# Patient Record
Sex: FEMALE | Race: WHITE
[De-identification: names, ages, dates, MRNs, and addresses within clinical notes are randomized per-mention and may not be internally consistent; named-entity substitution may affect disease eponyms.]

---

## 2017-04-01 ENCOUNTER — HOSPITAL ENCOUNTER (OUTPATIENT)
Dept: HOSPITAL 31 - C.ER | Age: 77
Setting detail: OBSERVATION
LOS: 2 days | Discharge: HOME | End: 2017-04-03
Attending: INTERNAL MEDICINE | Admitting: INTERNAL MEDICINE
Payer: MEDICARE

## 2017-04-01 VITALS — BODY MASS INDEX: 29.2 KG/M2

## 2017-04-01 DIAGNOSIS — R42: ICD-10-CM

## 2017-04-01 DIAGNOSIS — I10: ICD-10-CM

## 2017-04-01 DIAGNOSIS — E78.00: ICD-10-CM

## 2017-04-01 DIAGNOSIS — F41.8: ICD-10-CM

## 2017-04-01 DIAGNOSIS — I48.91: ICD-10-CM

## 2017-04-01 DIAGNOSIS — Z23: ICD-10-CM

## 2017-04-01 DIAGNOSIS — R07.9: Primary | ICD-10-CM

## 2017-04-01 LAB
ALBUMIN/GLOB SERPL: 1.4 {RATIO} (ref 1–2.1)
ALP SERPL-CCNC: 97 U/L (ref 38–126)
ALT SERPL-CCNC: 14 U/L (ref 9–52)
AST SERPL-CCNC: 23 U/L (ref 14–36)
BACTERIA #/AREA URNS HPF: (no result) /[HPF]
BASOPHILS # BLD AUTO: 0.1 K/UL (ref 0–0.2)
BASOPHILS NFR BLD: 0.8 % (ref 0–2)
BILIRUB SERPL-MCNC: 0.6 MG/DL (ref 0.2–1.3)
BILIRUB UR-MCNC: NEGATIVE MG/DL
BUN SERPL-MCNC: 27 MG/DL (ref 7–17)
CALCIUM SERPL-MCNC: 8.9 MG/DL (ref 8.6–10.4)
CHLORIDE SERPL-SCNC: 95 MMOL/L (ref 98–107)
CO2 SERPL-SCNC: 27 MMOL/L (ref 22–30)
EOSINOPHIL # BLD AUTO: 0.1 K/UL (ref 0–0.7)
EOSINOPHIL NFR BLD: 1.9 % (ref 0–4)
ERYTHROCYTE [DISTWIDTH] IN BLOOD BY AUTOMATED COUNT: 14.3 % (ref 11.5–14.5)
GLOBULIN SER-MCNC: 3.1 GM/DL (ref 2.2–3.9)
GLUCOSE SERPL-MCNC: 98 MG/DL (ref 65–105)
GLUCOSE UR STRIP-MCNC: NORMAL MG/DL
HCT VFR BLD CALC: 36.3 % (ref 34–47)
INR PPP: 1.6
KETONES UR STRIP-MCNC: NEGATIVE MG/DL
LEUKOCYTE ESTERASE UR-ACNC: (no result) LEU/UL
LYMPHOCYTES # BLD AUTO: 1.1 K/UL (ref 1–4.3)
LYMPHOCYTES NFR BLD AUTO: 15.7 % (ref 20–40)
MCH RBC QN AUTO: 30.4 PG (ref 27–31)
MCHC RBC AUTO-ENTMCNC: 32.8 G/DL (ref 33–37)
MCV RBC AUTO: 92.9 FL (ref 81–99)
MONOCYTES # BLD: 0.5 K/UL (ref 0–0.8)
MONOCYTES NFR BLD: 7.8 % (ref 0–10)
NRBC BLD AUTO-RTO: 0 % (ref 0–2)
PH UR STRIP: 7 [PH] (ref 5–8)
PLATELET # BLD: 184 K/UL (ref 130–400)
PMV BLD AUTO: 10.1 FL (ref 7.2–11.7)
POTASSIUM SERPL-SCNC: 4.2 MMOL/L (ref 3.6–5.2)
PROT SERPL-MCNC: 7.3 G/DL (ref 6.3–8.3)
PROT UR STRIP-MCNC: NEGATIVE MG/DL
RBC # UR STRIP: NEGATIVE /UL
RBC #/AREA URNS HPF: 1 /HPF (ref 0–3)
SODIUM SERPL-SCNC: 135 MMOL/L (ref 132–148)
SP GR UR STRIP: 1.01 (ref 1–1.03)
UROBILINOGEN UR-MCNC: NORMAL MG/DL (ref 0.2–1)
WBC # BLD AUTO: 6.9 K/UL (ref 4.8–10.8)
WBC #/AREA URNS HPF: 1 /HPF (ref 0–5)

## 2017-04-01 PROCEDURE — 80053 COMPREHEN METABOLIC PANEL: CPT

## 2017-04-01 PROCEDURE — 99285 EMERGENCY DEPT VISIT HI MDM: CPT

## 2017-04-01 PROCEDURE — 97161 PT EVAL LOW COMPLEX 20 MIN: CPT

## 2017-04-01 PROCEDURE — 96360 HYDRATION IV INFUSION INIT: CPT

## 2017-04-01 PROCEDURE — 71020: CPT

## 2017-04-01 PROCEDURE — 84484 ASSAY OF TROPONIN QUANT: CPT

## 2017-04-01 PROCEDURE — 85610 PROTHROMBIN TIME: CPT

## 2017-04-01 PROCEDURE — 83690 ASSAY OF LIPASE: CPT

## 2017-04-01 PROCEDURE — 93005 ELECTROCARDIOGRAM TRACING: CPT

## 2017-04-01 PROCEDURE — 97110 THERAPEUTIC EXERCISES: CPT

## 2017-04-01 PROCEDURE — 70450 CT HEAD/BRAIN W/O DYE: CPT

## 2017-04-01 PROCEDURE — 81001 URINALYSIS AUTO W/SCOPE: CPT

## 2017-04-01 PROCEDURE — 85025 COMPLETE CBC W/AUTO DIFF WBC: CPT

## 2017-04-01 NOTE — C.PDOC
History Of Present Illness


76 yr old female with PMHx of HTN and A-Fib, presents to  the ER with 

complaints of chest pain, dizziness and headache for the past few weeks, along 

with on and off left sided chest pain. Patient states she saw her PMD 4 days 

ago who said if she doesn't feel any better in few days  to come to ED. Patient 

denies fever, chills, vision changes, nausea, vomiting, weakness or numbness. 


Time Seen by Provider: 17 09:15


Chief Complaint (Nursing): Dizziness/Lightheaded


History Per: Patient


History/Exam Limitations: no limitations


Onset/Duration Of Symptoms: Days (Few days)


Current Symptoms Are (Timing): Still Present





Past Medical History


Reviewed: Historical Data, Nursing Documentation, Vital Signs


Vital Signs: 


 Last Vital Signs











Temp  98.3 F   17 15:35


 


Pulse  60   17 17:12


 


Resp  18   17 15:35


 


BP  156/69 H  17 15:35


 


Pulse Ox  95   17 15:35














- Medical History


PMH: Anxiety, Asthma, Atrial Fibrillation, Cardia Arrhythmia, Depression, 

Gastritis, HTN, Hypercholesterolemia


Surgical History: Cholecystectomy, Pacemaker (16)





- [facility] Procedures








CATARAC PHACOEMULS/ASPIR (07/21/15)


INSERT LENS AT CATAR EXT (07/21/15)


INSERT OF MONITOR DEV INTO CHEST SUBCU/FASCIA, PERC APPROACH (16)


VACCINATION NEC (14)








Family History: States: No Known Family Hx





- Social History


Hx Tobacco Use: No


Hx Alcohol Use: No


Hx Substance Use: No





- Immunization History


Hx Tetanus Toxoid Vaccination: No


Hx Influenza Vaccination: Yes


Hx Pneumococcal Vaccination: Yes





Review Of Systems


Except As Marked, All Systems Reviewed And Found Negative.


Constitutional: Negative for: Fever, Chills


Cardiovascular: Positive for: Chest Pain (Left sided)


Gastrointestinal: Negative for: Nausea, Vomiting


Neurological: Positive for: Headache, Dizziness.  Negative for: Weakness, 

Numbness





Physical Exam





- Physical Exam


Appears: Well, Non-toxic, No Acute Distress


Skin: Normal Color, Warm, Dry


Head: Atraumatic, Normacephalic


Eye(s): bilateral: Normal Inspection, PERRL, EOMI


Throat: Normal, No Erythema, No Exudate


Chest: Symmetrical, Tenderness (Non reproducible left chest tenderness)


Cardiovascular: Rhythm Regular, No Friction Rub, No Murmur


Respiratory: Normal Breath Sounds


Gastrointestinal/Abdominal: Normal Exam, Soft, No Tenderness, No Guarding, No 

Rebound


Extremity: Normal ROM, No Swelling


Neurological/Psych: Oriented x3, Normal Speech





ED Course And Treatment





- Laboratory Results


Result Diagrams: 


 17 09:29





 17 09:29


Lab Interpretation: Normal


ECG: Interpreted By Me


ECG Rhythm: A Paced


ECG Interpretation: No Acute Changes, Abnormal


Interpretation Of ECG: Atrial-paced rhythm. Nonspecific ST abnormality.


Rate From EC (BPM )


O2 Sat by Pulse Oximetry: 98 (RA)


Pulse Ox Interpretation: Normal





- Radiology


CXR: Interpreted by Me


CXR Interpretation: Yes: No Acute Disease





- CT Scan/US


  ** CT - Head 


Other Rad Studies (CT/US): Read By Radiologist, Radiology Report Reviewed


CT/US Interpretation: PROCEDURE:  CT HEAD WITHOUT CONTRAST.  HISTORY:  fall.  

COMPARISON:  Comparison is made to the previous study dated 09/10/2016.  

TECHNIQUE:  Axial computed tomography images were obtained through the head/

brain without intravenous contrast.  This CT exam was performed using one or 

more of the following dose reduction techniques: Automated exposure control, 

adjustment of the mA and/or kv according to patient size, and/or use of 

iterative reconstruction technique.  Radiation dose:  Total exam DLP = 825.82 

mGy-cm.  FINDINGS:  HEMORRHAGE:  No intracranial hemorrhage.  BRAIN:  No mass 

effect or edema.  No atrophy or chronic microvascular ischemic changes.  

VENTRICLES:  Unremarkable. No hydrocephalus.  CALVARIUM:  Unremarkable.  

PARANASAL SINUSES:  Unremarkable as visualized. No significant inflammatory 

changes.  MASTOID AIR CELLS:  Unremarkable as visualized. No inflammatory 

changes.  OTHER FINDINGS:  None.  IMPRESSION:  No evidence of acute 

intracranial hemorrhage intracranial collection mass effect or midline shift.  

No significant interval change compared to the previous study dated 09/10/2016.





- Physician Consult Information


Physician Contacted: Liseth Olivas


Outcome Of Conversation: tele





Medical Decision Making


Medical Decision Making: 


PLAN:


* CT - Head 


* CXR


* EKG 


* Troponin 


* CBC 


* Urinalysis 


* Sodium Chloride IV 





Disposition


Discussed With : Liseth Olivas


Doctor Will See Patient In The: Hospital





- Disposition


Disposition: HOSPITALIZED


Disposition Time: 10:40


Condition: STABLE





- POA


Present On Arrival: None





- Clinical Impression


Clinical Impression: 


 Chest pain, Dizziness





- PA / NP / Resident Statement


MD/DO has reviewed & agrees with the documentation as recorded.





- Scribe Statement


The provider has reviewed the documentation as recorded by the Scribe


Sofía Gallegos





All medical record entries made by the Lisette were at my direction and 

personally dictated by me. I have reviewed the chart and agree that the record 

accurately reflects my personal performance of the history, physical exam, 

medical decision making, and the department course for this patient. I have 

also personally directed, reviewed, and agree with the discharge instructions 

and disposition.





Decision To Admit





- Pt Status Changed To:


Hospital Disposition Of: Observation





- .


Bed Request Type: Telemetry


Admitting Physician: Liseth Olivas


Patient Diagnosis: 


 Chest pain, Dizziness

## 2017-04-01 NOTE — RAD
HISTORY:

Cough  



COMPARISON:

Comparison is made to the previous study dated 10/05/2016



TECHNIQUE:

Chest PA and lateral



FINDINGS:



LUNGS:

Prominent lung markings seen at the lower lobes again.



PLEURA:

No significant pleural effusion identified. No pneumothorax apparent.



CARDIOVASCULAR:

Left-sided pacemaker seen in place.



OSSEOUS STRUCTURES:

No significant abnormalities.



VISUALIZED UPPER ABDOMEN:

Normal.



OTHER FINDINGS:

None.



IMPRESSION:

Prominent reticular markings seen at the lower lobes.

## 2017-04-01 NOTE — CP.PCM.CON
History of Present Illness





- History of Present Illness


History of Present Illness: 


CC:  Presyncope and fatigue





HPI:  76 yr old female with PMHx of HTN and A-Fib, presents to  the ER with 

complaints of dizziness and headache for the past few weeks, along with on and 

off left sided chest pain. Patient states she saw her PMD 4 days ago who said 

if she doesn't feel any better in few days  to come to ED. Patient denies fever

, chills, vision changes, nausea, vomiting, weakness or numbness. She is also 

reporting shortness of breath on exertion improves with rest.  This has been 

occuring for the past 2 weeks.  She is reporting improvement in her symptoms 

now.  





Review of Systems





- Review of Systems


All systems: reviewed and no additional remarkable complaints except





Past Patient History





- Infectious Disease


Hx of Infectious Diseases: None





- Past Medical History & Family History


Past Medical History?: Yes





- Past Social History


Smoking Status: Never Smoked





- CARDIAC


Hx Atrial Fibrillation: Yes


Hx Cardia Arrhythmia: Yes


Hx Hypercholesterolemia: Yes


Hx Hypertension: Yes


Hx Pacemaker: Yes (6/16/16)





- PULMONARY


Hx Asthma: Yes





- NEUROLOGICAL


Hx Neurological Disorder: Yes


Hx Syncope: Yes


Hx Vertigo: Yes





- HEENT


Hx HEENT Problems: Yes





- RENAL


Hx Chronic Kidney Disease: No





- ENDOCRINE/METABOLIC


Hx Endocrine Disorders: No





- HEMATOLOGICAL/ONCOLOGICAL


Hx Blood Disorders: No





- INTEGUMENTARY


Hx Dermatological Problems: No





- MUSCULOSKELETAL/RHEUMATOLOGICAL


Hx Musculoskeletal Disorders: Yes


Hx Falls: No





- GASTROINTESTINAL


Hx Gastritis: Yes





- GENITOURINARY/GYNECOLOGICAL


Hx Genitourinary Disorders: No





- PSYCHIATRIC


Hx Anxiety: Yes


Hx Depression: Yes


Hx Substance Use: No





- SURGICAL HISTORY


Hx Surgeries: Yes


Hx Cholecystectomy: Yes





- ANESTHESIA


Hx Anesthesia: Yes


Hx Anesthesia Reactions: No


Hx Malignant Hyperthermia: No





Meds


Allergies/Adverse Reactions: 


 Allergies











Allergy/AdvReac Type Severity Reaction Status Date / Time


 


No Known Allergies Allergy   Verified 10/05/16 13:07














- Medications


Medications: 


 Current Medications





Carvedilol (Coreg)  6.25 mg PO BID [facility]


Diltiazem HCl (Cardizem Cd)  180 mg PO DAILY [facility]


Lisinopril (Zestril)  10 mg PO DAILY [facility]


Pantoprazole Sodium (Protonix Ec Tab)  40 mg PO DAILY [facility]


Rivaroxaban (Xarelto)  15 mg PO BID [facility]











Physical Exam





- Constitutional


Appears: Well, Non-toxic





- Head Exam


Head Exam: ATRAUMATIC, NORMAL INSPECTION





- Eye Exam


Eye Exam: absent: Scleral icterus


Pupil Exam: PERRL





- ENT Exam


ENT Exam: Mucous Membranes Moist, Normal Exam





- Neck Exam


Neck exam: Positive for: Full Rom.  Negative for: Lymphadenopathy





- Respiratory Exam


Respiratory Exam: Clear to Auscultation Bilateral, NORMAL BREATHING PATTERN





- Cardiovascular Exam


Cardiovascular Exam: Bradycardia, REGULAR RHYTHM, RRR, +S1, +S2.  absent: JVD





- GI/Abdominal Exam


GI & Abdominal Exam: Normal Bowel Sounds, Soft.  absent: Organomegaly





- Neurological Exam


Neurological exam: CN II-XII Intact, Oriented x3





- Psychiatric Exam


Psychiatric exam: Normal Affect, Normal Mood





Results





- Vital Signs


Recent Vital Signs: 


 Last Vital Signs











Temp  98.3 F   04/01/17 14:19


 


Pulse  60   04/01/17 14:19


 


Resp  20   04/01/17 14:19


 


BP  152/70 H  04/01/17 14:19


 


Pulse Ox  96   04/01/17 14:19














- Labs


Result Diagrams: 


 04/01/17 09:29





 04/01/17 09:29





- EKG Data


EKG Interpreted by: Myself (Telemetry viewed by myself:  NSR)





- Imaging and Cardiology


  ** Chest x-ray


Status: Image reviewed by me (no infiltrates or effusions )





Assessment & Plan





- Assessment and Plan (Free Text)


Assessment: 


76 year old female with paroxysmal atrial fibrillation s/p ablation and PPM, 

now presents with presyncope and shortness of breath unclear if there is a 

cardiac etiology


HTN is chronic and stable continue Coreg, lisinopril and cardizem





After reviewed of chart and re interview with patient she is reporting she is a 

patient of Dr. Xuan Darnell.  I will call him to imform his patient is 

here.  





- Date & Time


Date: 04/01/17


Time: 15:34

## 2017-04-01 NOTE — CP.PCM.HP
History of Present Illness





- History of Present Illness


History of Present Illness: 


pt came to er feeling palpitation diziness has at fib





Present on Admission





- Present on Admission


Any Indicators Present on Admission: No





Review of Systems





- Review of Systems


Systems not reviewed;Unavailable: Acuity of Condition





- Constitutional


Constitutional: Fatigue





- EENT


Eyes: As Per HPI


Ears: As Per HPI


Nose/Mouth/Throat: As Per HPI





- Breasts


Breasts: As Per HPI





- Cardiovascular


Cardiovascular: Chest Pain at Rest, Lightheadedness, Rapid Heart Rate





- Respiratory


Respiratory: Dyspnea on Exertion





- Gastrointestinal


Gastrointestinal: As Per HPI





- Genitourinary


Genitourinary: As Per HPI





- Reproductive: Female


Reproductive:Female: As Per HPI





- Menstruation


Menstruation: As Per HPI





- Musculoskeletal


Musculoskeletal: As Per HPI





- Integumentary


Integumentary: As Per HPI





- Neurological


Neurological: As Per HPI





- Psychiatric


Psychiatric: As Per HPI





- Endocrine


Endocrine: As Per HPI





- Hematologic/Lymphatic


Hematologic: As Per HPI





Past Patient History





- Infectious Disease


Hx of Infectious Diseases: None





- Past Medical History & Family History


Past Medical History?: Yes





- Past Social History


Smoking Status: Never Smoked





- CARDIAC


Hx Atrial Fibrillation: Yes


Hx Cardia Arrhythmia: Yes


Hx Hypercholesterolemia: Yes


Hx Hypertension: Yes


Hx Pacemaker: Yes (6/16/16)





- PULMONARY


Hx Asthma: Yes





- NEUROLOGICAL


Hx Neurological Disorder: Yes


Hx Syncope: Yes


Hx Vertigo: Yes





- HEENT


Hx HEENT Problems: Yes





- RENAL


Hx Chronic Kidney Disease: No





- ENDOCRINE/METABOLIC


Hx Endocrine Disorders: No





- HEMATOLOGICAL/ONCOLOGICAL


Hx Blood Disorders: No





- INTEGUMENTARY


Hx Dermatological Problems: No





- MUSCULOSKELETAL/RHEUMATOLOGICAL


Hx Musculoskeletal Disorders: Yes


Hx Falls: No





- GASTROINTESTINAL


Hx Gastritis: Yes





- GENITOURINARY/GYNECOLOGICAL


Hx Genitourinary Disorders: No





- PSYCHIATRIC


Hx Anxiety: Yes


Hx Depression: Yes


Hx Substance Use: No





- SURGICAL HISTORY


Hx Cholecystectomy: Yes





- ANESTHESIA


Hx Anesthesia: Yes


Hx Anesthesia Reactions: No


Hx Malignant Hyperthermia: No





Meds


Allergies/Adverse Reactions: 


 Allergies











Allergy/AdvReac Type Severity Reaction Status Date / Time


 


No Known Allergies Allergy   Verified 10/05/16 13:07














Physical Exam





- Constitutional


Appears: In Acute Distress





- Head Exam


Head Exam: ATRAUMATIC





- Eye Exam


Eye Exam: Normal appearance


Pupil Exam: NORMAL ACCOMODATION





- ENT Exam


ENT Exam: Mucous Membranes Moist





- Neck Exam


Neck exam: Positive for: Full Rom





- Respiratory Exam


Respiratory Exam: Clear to Auscultation Bilateral





- Cardiovascular Exam


Cardiovascular Exam: Irregular Rhythm





- GI/Abdominal Exam


GI & Abdominal Exam: Normal Bowel Sounds





- Rectal Exam


Rectal Exam: NORMAL INSPECTION





- Extremities Exam


Extremities exam: Positive for: normal inspection





- Back Exam


Back exam: NORMAL INSPECTION





- Neurological Exam


Neurological exam: Oriented x3





- Psychiatric Exam


Psychiatric exam: Normal Mood





- Skin


Skin Exam: Normal Color





Results





- Vital Signs


Recent Vital Signs: 





 Last Vital Signs











Temp  98.3 F   04/01/17 15:35


 


Pulse  60   04/01/17 17:12


 


Resp  18   04/01/17 15:35


 


BP  156/69 H  04/01/17 15:35


 


Pulse Ox  98   04/01/17 17:25














- Labs


Result Diagrams: 


 04/01/17 09:29





 04/01/17 09:29





Assessment & Plan





- Assessment and Plan (Free Text)


Assessment: 


at fib  diziness htn 


Plan: 


admit





- Date & Time


Date: 04/01/17


Time: 17:42

## 2017-04-02 RX ADMIN — DILTIAZEM HYDROCHLORIDE SCH MG: 180 CAPSULE, EXTENDED RELEASE ORAL at 09:48

## 2017-04-02 RX ADMIN — PANTOPRAZOLE SODIUM SCH MG: 40 TABLET, DELAYED RELEASE ORAL at 09:48

## 2017-04-02 NOTE — CP.PCM.PN
Subjective





- Date & Time of Evaluation


Date of Evaluation: 04/02/17


Time of Evaluation: 12:04





- Subjective


Subjective: 


no palpitation today but ct head hemorage





Objective





- Vital Signs/Intake and Output


Vital Signs (last 24 hours): 


 











Temp Pulse Resp BP Pulse Ox


 


 97.3 F L  70   20   130/64   96 


 


 04/02/17 08:11  04/02/17 09:51  04/02/17 09:51  04/02/17 11:04  04/02/17 08:11











- Medications


Medications: 


 Current Medications





Acetaminophen (Tylenol 325mg Tab)  650 mg PO Q6 PRN


   PRN Reason: headache


   Last Admin: 04/01/17 17:55 Dose:  650 mg


Carvedilol (Coreg)  6.25 mg PO BID [facility]


   Last Admin: 04/02/17 09:48 Dose:  6.25 mg


Diltiazem HCl (Cardizem Cd)  180 mg PO DAILY [facility]


   Last Admin: 04/02/17 09:48 Dose:  180 mg


Influenza Virus Vaccine (Afluria)  45 mcg IM .ONCE ONE


   Stop: 04/03/17 10:55


Lisinopril (Zestril)  10 mg PO DAILY [facility]


   Last Admin: 04/02/17 09:48 Dose:  10 mg


Pantoprazole Sodium (Protonix Ec Tab)  40 mg PO DAILY [facility]


   Last Admin: 04/02/17 09:48 Dose:  40 mg


Pneumococcal Polyvalent Vaccine (Pneumovax 23 Vaccine)  0.5 ml IM .ONCE ONE


   Stop: 04/03/17 10:01


Rivaroxaban (Xarelto)  15 mg PO BID [facility]


   Last Admin: 04/02/17 09:48 Dose:  15 mg











- Labs


Labs: 


 











PT  18.8 SECONDS (9.7-12.2)  H  04/01/17  09:29    


 


INR  1.6   04/01/17  09:29    














- Constitutional


Appears: Non-toxic





- Head Exam


Head Exam: NORMAL INSPECTION





- Eye Exam


Eye Exam: Normal appearance


Pupil Exam: NORMAL ACCOMODATION





- ENT Exam


ENT Exam: Mucous Membranes Moist





- Neck Exam


Neck Exam: Full ROM





- Respiratory Exam


Respiratory Exam: Decreased Breath Sounds





- Cardiovascular Exam


Cardiovascular Exam: Irregular Rhythm





- GI/Abdominal Exam


GI & Abdominal Exam: Normal Bowel Sounds





- Extremities Exam


Extremities Exam: Normal Inspection





- Back Exam


Back Exam: NORMAL INSPECTION





- Psychiatric Exam


Psychiatric exam: Normal Affect





- Skin


Skin Exam: Normal Color





Assessment and Plan





- Assessment and Plan (Free Text)


Assessment: 


at fib ac brain hge depresion hx


Plan: 


d/c

## 2017-04-02 NOTE — CP.PCM.CON
History of Present Illness





- History of Present Illness


History of Present Illness: 





75 y/o female h/o HTN atial fib s/p ppm presents with palpitations.  Was at 

home and felt heart racing and came to hospital.  No cp , nausea, sob dysuria





Review of Systems





- Review of Systems


Systems not reviewed;Unavailable: Acuity of Condition





- Constitutional


Constitutional: Fatigue





- EENT


Eyes: absent: Change in Vision


Ears: absent: Ear Pain


Nose/Mouth/Throat: absent: Nasal Discharge





- Cardiovascular


Cardiovascular: Rapid Heart Rate.  absent: Chest Pain





- Respiratory


Respiratory: absent: Dyspnea





- Gastrointestinal


Gastrointestinal: absent: Abdominal Pain





- Genitourinary


Genitourinary: Dysuria





- Musculoskeletal


Musculoskeletal: absent: Loss of Height





- Integumentary


Integumentary: absent: Change in Hair





- Neurological


Neurological: absent: Abnormal Movements





- Psychiatric


Psychiatric: absent: Anxiety





- Endocrine


Endocrine: absent: Excessive Sweating





- Hematologic/Lymphatic


Hematologic: absent: Easy Bruising





Past Patient History





- Infectious Disease


Hx of Infectious Diseases: None





- Past Medical History & Family History


Past Medical History?: Yes





- Past Social History


Smoking Status: Never Smoked





- CARDIAC


Hx Hypercholesterolemia: Yes


Hx Hypertension: Yes





- PULMONARY


Hx Asthma: Yes





- NEUROLOGICAL


Hx Neurological Disorder: Yes


Hx Syncope: Yes


Hx Vertigo: Yes





- HEENT


Hx HEENT Problems: Yes





- RENAL


Hx Chronic Kidney Disease: No





- ENDOCRINE/METABOLIC


Hx Endocrine Disorders: No





- HEMATOLOGICAL/ONCOLOGICAL


Hx Blood Disorders: No





- INTEGUMENTARY


Hx Dermatological Problems: No





- MUSCULOSKELETAL/RHEUMATOLOGICAL


Hx Musculoskeletal Disorders: Yes


Hx Falls: No





- GASTROINTESTINAL


Hx Gastritis: Yes





- GENITOURINARY/GYNECOLOGICAL


Hx Genitourinary Disorders: No





- PSYCHIATRIC


Hx Anxiety: Yes


Hx Depression: Yes


Hx Substance Use: No





- SURGICAL HISTORY


Hx Surgeries: Yes


Hx Cholecystectomy: Yes





- ANESTHESIA


Hx Anesthesia: Yes


Hx Anesthesia Reactions: No


Hx Malignant Hyperthermia: No





Meds


Allergies/Adverse Reactions: 


 Allergies











Allergy/AdvReac Type Severity Reaction Status Date / Time


 


No Known Allergies Allergy   Verified 10/05/16 13:07














- Medications


Medications: 


 Current Medications





Acetaminophen (Tylenol 325mg Tab)  650 mg PO Q6 PRN


   PRN Reason: headache


   Last Admin: 04/01/17 17:55 Dose:  650 mg


Carvedilol (Coreg)  6.25 mg PO BID [facility]


   Last Admin: 04/02/17 09:48 Dose:  6.25 mg


Diltiazem HCl (Cardizem Cd)  180 mg PO DAILY [facility]


   Last Admin: 04/02/17 09:48 Dose:  180 mg


Influenza Virus Vaccine (Afluria)  45 mcg IM .ONCE ONE


   Stop: 04/03/17 10:55


Lisinopril (Zestril)  10 mg PO DAILY [facility]


   Last Admin: 04/02/17 09:48 Dose:  10 mg


Pantoprazole Sodium (Protonix Ec Tab)  40 mg PO DAILY [facility]


   Last Admin: 04/02/17 09:48 Dose:  40 mg


Pneumococcal Polyvalent Vaccine (Pneumovax 23 Vaccine)  0.5 ml IM .ONCE ONE


   Stop: 04/03/17 10:01


Rivaroxaban (Xarelto)  15 mg PO BID [facility]


   Last Admin: 04/02/17 09:48 Dose:  15 mg











Physical Exam





- Constitutional


Appears: Non-toxic





- Head Exam


Head Exam: NORMAL INSPECTION





- Eye Exam


Eye Exam: Normal appearance





- ENT Exam


ENT Exam: Mucous Membranes Moist





- Respiratory Exam


Respiratory Exam: Clear to Auscultation Bilateral





- Cardiovascular Exam


Cardiovascular Exam: REGULAR RHYTHM, Systolic Murmur





- GI/Abdominal Exam


GI & Abdominal Exam: Normal Bowel Sounds





-  Exam


External exam: NORMAL EXTERNAL EXAM


Bimanual exam: NORMAL BIMANUAL EXAM





- Extremities Exam


Extremities exam: Positive for: normal inspection





- Neurological Exam


Neurological exam: Alert





- Psychiatric Exam


Psychiatric exam: Normal Mood





- Skin


Skin Exam: Warm





Results





- Vital Signs


Recent Vital Signs: 


 Last Vital Signs











Temp  97.3 F L  04/02/17 08:11


 


Pulse  70   04/02/17 09:51


 


Resp  20   04/02/17 09:51


 


BP  130/64   04/02/17 11:04


 


Pulse Ox  96   04/02/17 08:11














- Labs


Result Diagrams: 


 04/01/17 09:29





 04/01/17 09:29


Labs: 


 Laboratory Results - last 24 hr











  04/01/17





  17:08


 


Total Creatine Kinase  88


 


CK-MB (Mass)  1.32


 


Troponin I, Quant  < 0.0120














Assessment & Plan


(1) Afib


Assessment and Plan: 


Pt will benefit from rate control and anticoagulation.  Pacing on EKG apaced v 

paced


will follow with you


Status: Acute   





(2) Bradycardia


Status: Acute   





(3) History of pacemaker


Status: Acute

## 2017-04-03 VITALS
RESPIRATION RATE: 20 BRPM | TEMPERATURE: 97.7 F | SYSTOLIC BLOOD PRESSURE: 112 MMHG | OXYGEN SATURATION: 96 % | DIASTOLIC BLOOD PRESSURE: 59 MMHG

## 2017-04-03 VITALS — HEART RATE: 50 BPM

## 2017-04-03 RX ADMIN — DILTIAZEM HYDROCHLORIDE SCH MG: 180 CAPSULE, EXTENDED RELEASE ORAL at 10:51

## 2017-04-03 RX ADMIN — PANTOPRAZOLE SODIUM SCH MG: 40 TABLET, DELAYED RELEASE ORAL at 10:50

## 2017-04-03 NOTE — CP.PCM.PN
Addendum entered and electronically signed by Robina Morales DO  04/03/17 15:

12: 





Patient appeared anxious upon examined. Given Xanax 0.25mg BID prn anxiety. 

Patient is clear for d.c as per cardiology standpoint. She can follow up with 

Dr. Murphy in 1 week. 





Original Note:








<Robina Morales - Last Filed: 04/03/17 09:30>





Subjective





- Date & Time of Evaluation


Date of Evaluation: 04/03/17


Time of Evaluation: 07:20





- Subjective


Subjective: 


Cardiology Progress Note for Dr. Murphy 





Patient seen and examined. Overnight patient noted to have HR minimum in the 

40s and going back up to the 120s. She was asymptomatic at this time. She 

denies having any palpitations, CP, SOB, n/v/d, numbness/tingling. She did 

report feeling dizzy during the day yesterday, but does not feel dizzy today. 





Objective





- Vital Signs/Intake and Output


Vital Signs (last 24 hours): 


 











Temp Pulse Resp BP Pulse Ox


 


 97.6 F   59 L  20   115/66   95 


 


 04/02/17 23:15  04/03/17 04:41  04/02/17 23:15  04/02/17 23:15  04/02/17 23:15











- Medications


Medications: 


 Current Medications





Acetaminophen (Tylenol 325mg Tab)  650 mg PO Q6 PRN


   PRN Reason: headache


   Last Admin: 04/01/17 17:55 Dose:  650 mg


Carvedilol (Coreg)  6.25 mg PO BID [facility]


   Last Admin: 04/02/17 17:31 Dose:  6.25 mg


Diltiazem HCl (Cardizem Cd)  180 mg PO DAILY [facility]


   Last Admin: 04/02/17 09:48 Dose:  180 mg


Influenza Virus Vaccine (Afluria)  45 mcg IM .ONCE ONE


   Stop: 04/03/17 10:55


Lisinopril (Zestril)  10 mg PO DAILY [facility]


   Last Admin: 04/02/17 09:48 Dose:  10 mg


Pantoprazole Sodium (Protonix Ec Tab)  40 mg PO DAILY [facility]


   Last Admin: 04/02/17 09:48 Dose:  40 mg


Pneumococcal Polyvalent Vaccine (Pneumovax 23 Vaccine)  0.5 ml IM .ONCE ONE


   Stop: 04/03/17 10:01


Rivaroxaban (Xarelto)  15 mg PO BID [facility]


   Last Admin: 04/02/17 17:31 Dose:  15 mg











- Labs


Labs: 


 











PT  18.8 SECONDS (9.7-12.2)  H  04/01/17  09:29    


 


INR  1.6   04/01/17  09:29    














- Constitutional


Appears: No Acute Distress





- Head Exam


Head Exam: NORMAL INSPECTION





- Eye Exam


Eye Exam: Normal appearance


Pupil Exam: NORMAL ACCOMODATION





- ENT Exam


ENT Exam: Mucous Membranes Moist





- Respiratory Exam


Respiratory Exam: Clear to Ausculation Bilateral, NORMAL BREATHING PATTERN.  

absent: Rales, Rhonchi, Wheezes, Stridor





- Cardiovascular Exam


Cardiovascular Exam: REGULAR RHYTHM, +S1, +S2.  absent: Gallop, Rubs, Murmur





- GI/Abdominal Exam


GI & Abdominal Exam: Soft, Normal Bowel Sounds.  absent: Rigid, Tenderness, Mass

, Rebound





- Extremities Exam


Extremities Exam: Normal Inspection.  absent: Calf Tenderness, Pedal Edema





- Neurological Exam


Neurological Exam: Alert, Awake, CN II-XII Intact, Oriented x3





- Psychiatric Exam


Psychiatric exam: Normal Affect, Normal Mood





- Skin


Skin Exam: Dry, Normal Color, Warm





Assessment and Plan





- Assessment and Plan (Free Text)


Assessment: 


This is a 76Y F with PMH HTN and A.fib s/p ablation admitted for 


1) Chest pain 


2) HTN


3) A.fib s/p ablation 


Plan: 


- Continue Coreg, Cardizem, Lisinopril


- Continue Xeralto


- Continue to monitor vitals 





GI ppx: Protonix


DVT ppx: Xeralto





Case seen, reviewed and discussed with Dr. Jeffrey Morales PGY1





<Gabriela Murphy - Last Filed: 05/08/17 05:28>





Objective





- Vital Signs/Intake and Output


Vital Signs (last 24 hours): 


 











Temp Pulse Resp BP Pulse Ox


 


 97.7 F   50 L  20   112/59 L  96 


 


 04/03/17 15:00  04/03/17 16:00  04/03/17 15:00  04/03/17 15:00  04/03/17 15:00











- Labs


Labs: 


 











PT  18.8 SECONDS (9.7-12.2)  H  04/01/17  09:29    


 


INR  1.6   04/01/17  09:29    














Attending/Attestation





- Attestation


I have personally seen and examined this patient.: Yes


I have fully participated in the care of the patient.: Yes


I have reviewed all pertinent clinical information, including history, physical 

exam and plan: Yes


Notes (Text): 





05/08/17 05:27


continue rate control and xarelto for cva prevention

## 2017-04-03 NOTE — CP.PCM.PN
Subjective





- Date & Time of Evaluation


Date of Evaluation: 04/03/17


Time of Evaluation: 17:53





- Subjective


Subjective: 


feels beter no pain no palpitation seen by cardiology cleare for d/c home 





Objective





- Vital Signs/Intake and Output


Vital Signs (last 24 hours): 


 











Temp Pulse Resp BP Pulse Ox


 


 97.7 F   50 L  20   112/59 L  96 


 


 04/03/17 15:00  04/03/17 16:00  04/03/17 15:00  04/03/17 15:00  04/03/17 15:00








Intake and Output: 


 











 04/03/17 04/03/17





 06:59 18:59


 


Intake Total  550


 


Balance  550














- Medications


Medications: 


 Current Medications





Acetaminophen (Tylenol 325mg Tab)  650 mg PO Q6 PRN


   PRN Reason: headache


   Last Admin: 04/01/17 17:55 Dose:  650 mg


Alprazolam (Xanax)  0.25 mg PO BID PRN


   PRN Reason: Anxiety


   Stop: 04/10/17 15:12


Carvedilol (Coreg)  6.25 mg PO BID [facility]


   Last Admin: 04/03/17 17:09 Dose:  6.25 mg


Diltiazem HCl (Cardizem Cd)  180 mg PO DAILY [facility]


   Last Admin: 04/03/17 10:51 Dose:  180 mg


Lisinopril (Zestril)  10 mg PO DAILY [facility]


   Last Admin: 04/03/17 10:50 Dose:  10 mg


Pantoprazole Sodium (Protonix Ec Tab)  40 mg PO DAILY [facility]


   Last Admin: 04/03/17 10:50 Dose:  40 mg


Rivaroxaban (Xarelto)  15 mg PO BID [facility]


   Last Admin: 04/03/17 17:09 Dose:  15 mg











- Labs


Labs: 


 











PT  18.8 SECONDS (9.7-12.2)  H  04/01/17  09:29    


 


INR  1.6   04/01/17  09:29    














- Constitutional


Appears: Non-toxic





- Head Exam


Head Exam: NORMAL INSPECTION





- Eye Exam


Eye Exam: Normal appearance


Pupil Exam: NORMAL ACCOMODATION





- ENT Exam


ENT Exam: Mucous Membranes Moist





- Neck Exam


Neck Exam: Full ROM





- Respiratory Exam


Respiratory Exam: NORMAL BREATHING PATTERN





- Cardiovascular Exam


Cardiovascular Exam: Irregular Rhythm





- GI/Abdominal Exam


GI & Abdominal Exam: Normal Bowel Sounds





-  Exam


 Exam: NORMAL INSPECTION





- Extremities Exam


Extremities Exam: Full ROM





- Back Exam


Back Exam: NORMAL INSPECTION





- Neurological Exam


Neurological Exam: Alert, Normal Gait, Oriented x3





- Psychiatric Exam


Psychiatric exam: Normal Affect





- Skin


Skin Exam: Normal Color





Assessment and Plan





- Assessment and Plan (Free Text)


Assessment: 


s/p chest pain s/p palpitation at fib anexiety stale improved rate control will 

d/c home f/u by cardiology

## 2017-04-06 NOTE — CARD
--------------- APPROVED REPORT --------------





EKG Measurement

Heart [id number]

CT 230P-4

[id number]

[id number]

[id number]



<Conclusion>

Atrial-paced rhythm with prolonged AV conduction

Nonspecific ST abnormality

Abnormal ECG

## 2018-02-11 ENCOUNTER — HOSPITAL ENCOUNTER (OUTPATIENT)
Dept: HOSPITAL 31 - C.ER | Age: 78
Setting detail: OBSERVATION
LOS: 2 days | Discharge: HOME | End: 2018-02-13
Attending: INTERNAL MEDICINE | Admitting: INTERNAL MEDICINE
Payer: MEDICARE

## 2018-02-11 VITALS — BODY MASS INDEX: 29.2 KG/M2

## 2018-02-11 DIAGNOSIS — R07.9: ICD-10-CM

## 2018-02-11 DIAGNOSIS — I48.91: Primary | ICD-10-CM

## 2018-02-11 DIAGNOSIS — E78.00: ICD-10-CM

## 2018-02-11 DIAGNOSIS — Z79.01: ICD-10-CM

## 2018-02-11 DIAGNOSIS — E03.9: ICD-10-CM

## 2018-02-11 DIAGNOSIS — Z95.0: ICD-10-CM

## 2018-02-11 DIAGNOSIS — J45.909: ICD-10-CM

## 2018-02-11 DIAGNOSIS — E78.5: ICD-10-CM

## 2018-02-11 LAB
ALBUMIN SERPL-MCNC: 4 G/DL (ref 3.5–5)
ALBUMIN/GLOB SERPL: 1.2 {RATIO} (ref 1–2.1)
ALT SERPL-CCNC: 22 U/L (ref 9–52)
AST SERPL-CCNC: 29 U/L (ref 14–36)
BASOPHILS # BLD AUTO: 0 K/UL (ref 0–0.2)
BASOPHILS NFR BLD: 0.7 % (ref 0–2)
BNP SERPL-MCNC: 165 PG/ML (ref 0–900)
BUN SERPL-MCNC: 25 MG/DL (ref 7–17)
CALCIUM SERPL-MCNC: 9.3 MG/DL (ref 8.6–10.4)
EOSINOPHIL # BLD AUTO: 0.1 K/UL (ref 0–0.7)
EOSINOPHIL NFR BLD: 1.9 % (ref 0–4)
ERYTHROCYTE [DISTWIDTH] IN BLOOD BY AUTOMATED COUNT: 13.3 % (ref 11.5–14.5)
GFR NON-AFRICAN AMERICAN: 48
HGB BLD-MCNC: 12.2 G/DL (ref 11–16)
LYMPHOCYTES # BLD AUTO: 1.3 K/UL (ref 1–4.3)
LYMPHOCYTES NFR BLD AUTO: 18.7 % (ref 20–40)
MCH RBC QN AUTO: 31.6 PG (ref 27–31)
MCHC RBC AUTO-ENTMCNC: 34.2 G/DL (ref 33–37)
MCV RBC AUTO: 92.6 FL (ref 81–99)
MONOCYTES # BLD: 0.7 K/UL (ref 0–0.8)
MONOCYTES NFR BLD: 9.2 % (ref 0–10)
NEUTROPHILS # BLD: 5 K/UL (ref 1.8–7)
NEUTROPHILS NFR BLD AUTO: 69.5 % (ref 50–75)
NRBC BLD AUTO-RTO: 0 % (ref 0–2)
PLATELET # BLD: 184 K/UL (ref 130–400)
PMV BLD AUTO: 10.7 FL (ref 7.2–11.7)
RBC # BLD AUTO: 3.87 MIL/UL (ref 3.8–5.2)
WBC # BLD AUTO: 7.2 K/UL (ref 4.8–10.8)

## 2018-02-11 PROCEDURE — 84436 ASSAY OF TOTAL THYROXINE: CPT

## 2018-02-11 PROCEDURE — 83036 HEMOGLOBIN GLYCOSYLATED A1C: CPT

## 2018-02-11 PROCEDURE — 84443 ASSAY THYROID STIM HORMONE: CPT

## 2018-02-11 PROCEDURE — 84100 ASSAY OF PHOSPHORUS: CPT

## 2018-02-11 PROCEDURE — 85610 PROTHROMBIN TIME: CPT

## 2018-02-11 PROCEDURE — 84484 ASSAY OF TROPONIN QUANT: CPT

## 2018-02-11 PROCEDURE — 80053 COMPREHEN METABOLIC PANEL: CPT

## 2018-02-11 PROCEDURE — 85025 COMPLETE CBC W/AUTO DIFF WBC: CPT

## 2018-02-11 PROCEDURE — 83880 ASSAY OF NATRIURETIC PEPTIDE: CPT

## 2018-02-11 PROCEDURE — 83735 ASSAY OF MAGNESIUM: CPT

## 2018-02-11 PROCEDURE — 85730 THROMBOPLASTIN TIME PARTIAL: CPT

## 2018-02-11 PROCEDURE — 93005 ELECTROCARDIOGRAM TRACING: CPT

## 2018-02-11 PROCEDURE — 96374 THER/PROPH/DIAG INJ IV PUSH: CPT

## 2018-02-11 PROCEDURE — 99285 EMERGENCY DEPT VISIT HI MDM: CPT

## 2018-02-11 PROCEDURE — 80061 LIPID PANEL: CPT

## 2018-02-11 PROCEDURE — 90674 CCIIV4 VAC NO PRSV 0.5 ML IM: CPT

## 2018-02-11 PROCEDURE — 36415 COLL VENOUS BLD VENIPUNCTURE: CPT

## 2018-02-11 PROCEDURE — 71046 X-RAY EXAM CHEST 2 VIEWS: CPT

## 2018-02-11 PROCEDURE — 93306 TTE W/DOPPLER COMPLETE: CPT

## 2018-02-11 NOTE — RAD
HISTORY:

chest pain  



COMPARISON:

Chest x-ray performed 4/1/17 



TECHNIQUE:

Chest PA and lateral



FINDINGS:





LUNGS:

Left hilar prominence. Prominent lung markings involving bilateral 

lung bases. Scattered probable calcified granulomas. 



Please note that chest x-ray has limited sensitivity for the 

detection of pulmonary masses.



PLEURA:

No significant pleural effusion identified. No definite pneumothorax .



CARDIOVASCULAR:

Dual lead left-sided pacemaker.  Mild cardiomegaly.  Ectatic aorta 

containing dense atherosclerotic calcifications.



OSSEOUS STRUCTURES:

Degenerative changes.



VISUALIZED UPPER ABDOMEN:

Unremarkable.



OTHER FINDINGS:

None.



IMPRESSION:

Left hilar prominence. Prominent lung markings involving bilateral 

lung bases. Scattered probable calcified granulomas. 



Dual lead left-sided pacemaker.  Mild cardiomegaly.  Ectatic aorta 

containing dense atherosclerotic calcifications.

## 2018-02-11 NOTE — CP.PCM.HP
History of Present Illness





- History of Present Illness


History of Present Illness: 





HPI: 77 year old female with past medical history of HTN, HLD, Anxiety/ 

depression, asthma, gastritis, hypothyroid, a fib with dual chamber pacemaker 

currently on Xeralto is admitted for palpitations and chest pain.  Patient 

states that earlier in the morning, she was sitting eating breakfast when all 

of a sudden she developed chest pain located substrenally radiating to left 

side and to left shoulder.  In the ED, troponins and EKG were normal.  On 

monitor, pt is noted to have atrial pacing with a HR of 100. Patient states she 

took all of her home medications this morning.  Pt denies having any SOB, abd 

pain, N/V/D/C, cough, F/C.





PMHx: stated above


Meds: Xarelto 15mg BID, Cardizem 180 mg po qd, Protonix 40 mg po qd, Benicar 1 

tab po qd, Synthroid 75 mcg po qd, Coreg 6.25 mg po bid, Atorvastatin 10 mg po 

hs, Aspirin 81 mg po qd 


Allergies: denies


PSHx: cataracts , pacemaker 2016,  and cholecystectomy in 

[place] many years ago, cardiac cath in 


FamHx: non-contributory


Social: lives alone, family in [place], denies tobacco, alcohol, drugs


PMD:Dr. Archuleta 





Present on Admission





- Present on Admission


Any Indicators Present on Admission: No





Review of Systems





- Constitutional


Constitutional: absent: Chills, Fever





- EENT


Eyes: absent: Blurred Vision, Change in Vision


Ears: absent: Dizziness


Nose/Mouth/Throat: absent: Nasal Congestion, Sore Throat, Throat Swelling





- Cardiovascular


Cardiovascular: Chest Pain, Palpitations.  absent: Dyspnea, Pedal Edema





- Respiratory


Respiratory: absent: Cough, Dyspnea, Wheezing, Snoring, Pain with Coughing





- Gastrointestinal


Gastrointestinal: absent: Abdominal Pain, Constipation, Diarrhea, Nausea, 

Vomiting





- Genitourinary


Genitourinary: absent: Dysuria, Urinary Frequency, Freq UTI





- Musculoskeletal


Musculoskeletal: absent: Back Pain, Neck Pain





- Integumentary


Integumentary: absent: Lesions, Rash





- Neurological


Neurological: absent: Syncope, Tremor, Vertigo





- Psychiatric


Psychiatric: absent: Anxiety, Depression





Past Patient History





- Infectious Disease


Hx of Infectious Diseases: None





- Past Medical History & Family History


Past Medical History?: Yes





- Past Social History


Smoking Status: Never Smoked


Chewing Tobacco Use: No


Cigar Use: No


Alcohol: None


Drugs: Denies


Home Situation {Lives}: With Family





- CARDIAC


Hx Atrial Fibrillation: Yes


Hx Cardia Arrhythmia: Yes


Hx Hypercholesterolemia: Yes


Hx Hypertension: Yes


Hx Pacemaker: Yes (16)





- PULMONARY


Hx Asthma: Yes





- NEUROLOGICAL


Hx Neurological Disorder: Yes


Hx Syncope: Yes


Hx Vertigo: Yes





- HEENT


Hx HEENT Problems: Yes





- RENAL


Hx Chronic Kidney Disease: No





- ENDOCRINE/METABOLIC


Hx Endocrine Disorders: No





- HEMATOLOGICAL/ONCOLOGICAL


Hx Blood Disorders: No





- INTEGUMENTARY


Hx Dermatological Problems: No





- MUSCULOSKELETAL/RHEUMATOLOGICAL


Hx Musculoskeletal Disorders: Yes


Hx Falls: No





- GASTROINTESTINAL


Hx Gastritis: Yes





- GENITOURINARY/GYNECOLOGICAL


Hx Genitourinary Disorders: No





- PSYCHIATRIC


Hx Anxiety: Yes


Hx Depression: Yes


Hx Substance Use: No





- SURGICAL HISTORY


Hx Cholecystectomy: Yes





- ANESTHESIA


Hx Anesthesia: Yes


Hx Anesthesia Reactions: No


Hx Malignant Hyperthermia: No





Meds


Allergies/Adverse Reactions: 


 Allergies











Allergy/AdvReac Type Severity Reaction Status Date / Time


 


No Known Allergies Allergy   Verified 18 11:28














Physical Exam





- Constitutional


Appears: Non-toxic, No Acute Distress





- Head Exam


Head Exam: ATRAUMATIC





- ENT Exam


ENT Exam: Mucous Membranes Moist





- Respiratory Exam


Respiratory Exam: Clear to Auscultation Bilateral.  absent: Accessory Muscle Use

, Rales, Rhonchi, Wheezes, Respiratory Distress





- Cardiovascular Exam


Cardiovascular Exam: Irregular Rhythm, +S1, +S2





- GI/Abdominal Exam


GI & Abdominal Exam: Normal Bowel Sounds, Soft.  absent: Distended, Firm, 

Guarding, Rigid





- Extremities Exam


Extremities exam: Negative for: pedal edema, tenderness





- Neurological Exam


Neurological exam: Alert, Oriented x3





- Psychiatric Exam


Psychiatric exam: Normal Affect, Normal Mood





- Skin


Skin Exam: Dry, Intact, Normal Color, Warm





Results





- Vital Signs


Recent Vital Signs: 





 Last Vital Signs











Temp  98.6 F   18 11:25


 


Pulse  89   18 13:58


 


Resp  17   18 13:58


 


BP  145/76   18 13:58


 


Pulse Ox  96   18 13:58














- Labs


Result Diagrams: 


 18 11:48





 18 11:48


Labs: 





 Laboratory Results - last 24 hr











  18





  11:48 11:48


 


WBC  7.2 


 


RBC  3.87 


 


Hgb  12.2 


 


Hct  35.8 


 


MCV  92.6 


 


MCH  31.6 H 


 


MCHC  34.2 


 


RDW  13.3 


 


Plt Count  184 


 


MPV  10.7 


 


Neut % (Auto)  69.5 


 


Lymph % (Auto)  18.7 L 


 


Mono % (Auto)  9.2 


 


Eos % (Auto)  1.9 


 


Baso % (Auto)  0.7 


 


Neut # (Auto)  5.0 


 


Lymph # (Auto)  1.3 


 


Mono # (Auto)  0.7 


 


Eos # (Auto)  0.1 


 


Baso # (Auto)  0.0 


 


Sodium   136


 


Potassium   3.9


 


Chloride   99


 


Carbon Dioxide   26


 


Anion Gap   15


 


BUN   25 H


 


Creatinine   1.1


 


Est GFR ( Amer)   59


 


Est GFR (Non-Af Amer)   48


 


Random Glucose   93


 


Calcium   9.3


 


Total Bilirubin   0.4


 


AST   29


 


ALT   22


 


Alkaline Phosphatase   105


 


Troponin I   < 0.0120


 


NT-Pro-B Natriuret Pep   165


 


Total Protein   7.2


 


Albumin   4.0


 


Globulin   3.2


 


Albumin/Globulin Ratio   1.2














Assessment & Plan





- Assessment and Plan (Free Text)


Assessment: 





77 year old female with past medical history of HTN, HLD, Anxiety/ depression, 

asthma, gastritis, hypothyroid, a fib with dual chamber pacemaker currently on 

Xeralto





A fib with atrial and ventricular pacing


- Pt will receive 10 mg IVP of cardizem stat


- She will then be given cardizem po 60 mg q6


-Continue home medication xeralto 15 mg po bid, aspirin 81 mg po qd


- Cardiology, Dr. Iniguez is consulted.  As patient has a dual chamber 

pacemaker, her HR should be controlled.  Tachycardia likely due to an aberrant 

rhythm.  Will likely require a pacemaker interrogation





Chest pain


- Initial troponins and EKG are normal


- will do serial trops and ekgs


- Continue home medications: Aspirin, Cozaar, Coreg 6.25 mg po bid


- Will check chest x ray


- Last echo in chart is from 10/2016 which was normal





Hypothyroid


- continue home medication synthroid 75 mcg po qd


- will check TSH and T4 





HLD


- Continue home medication Atorvastatin 10 mg po hs


- will check lipid panel





Prophylaxis


- Continue home medication protonix


- Xeralto





Case discussed with Dr. Soto.  All orders and managements per Dr. Soto





- Date & Time


Date: 18


Time: 16:46

## 2018-02-11 NOTE — CP.PCM.CON
History of Present Illness





- History of Present Illness


History of Present Illness: 


ASKED TO SEE PT BY DR EUGENE STINSON (COVERING HIS SERVICE).  





PT PRESENTED WITH SUDDEN ONSET OF PALP WITH ASSOCIATED CP RADIATING TO LEFT 

ARM.  PT CONTINUES TO HAVE CP WITH AMBULATIUON.  SHE IS ON ELIQUIS FOR AFIB.  

MULTIPLE ARRYTHMIAS NOTED ON TELE.  PT HAS A PPM, NO CATH REPORT NOTED IN 

SYSTEM.





Review of Systems





- Constitutional


Constitutional: As Per HPI.  absent: Anorexia, Chills, Daytime Sleepiness, 

Excessive Sweating, Fatigue, Fever, Frequent Falls, Headache, Increased Appetite

, Lethargy, Malaise, Night Sweats, Snoring, Sleep Apnea, Weight Gain, Weight 

Loss, Weakness, Other





- EENT


Eyes: As Per HPI.  absent: Blind Spots, Blurred Vision, Change in Vision, 

Decreased Night Vision, Diplopia, Discharge, Dry Eye, Exophthalmos, Floaters, 

Irritation, Itchy Eyes, Loss of Peripheral Vision, Pain, Photophobia, Requires 

Corrective Lenses, Sees Flashes, Spots in Vision, Tunnel Vision, Other Visual 

Disturbances, Loss of Vision, Other


Ears: As Per HPI.  absent: Decreased Hearing, Ear Discharge, Ear Pain, Tinnitus

, Abnormal Hearing, Disequilibrium, Dizziness, Other


Nose/Mouth/Throat: As Per HPI.  absent: Epistaxis, Nasal Congestion, Nasal 

Discharge, Nasal Obstruction, Nasal Trauma, Nose Pain, Post Nasal Drip, Sinus 

Pain, Sinus Pressure, Bleeding Gums, Change in Voice, Dental Pain, Dry Mouth, 

Dysphagia, Halitosis, Hoarsness, Lip Swelling, Mouth Lesions, Mouth Pain, 

Odynophagia, Sore Throat, Throat Swelling, Tongue Swelling, Facial Pain, Neck 

Pain, Neck Mass, Other





- Breasts


Breasts: As Per HPI.  absent: Change in Shape, Mass, Pain, Nipple Discharge, 

Nipple Inversion, Skin Changes, Swelling, Other





- Cardiovascular


Cardiovascular: As Per HPI, Chest Pain with Activity, Pain Radiating to Arm/Neck

/Jaw, Palpitations.  absent: Acrocyanosis, Chest Pain, Chest Pain at Rest, 

Claudication, Diaphoresis, Dyspnea, Dyspnea on Exertion, Edema, Irregular Heart 

Rhythm, Leg Edema, Leg Ulcers, Lightheadedness, Orthopnea, Paroxysmal Nocturnal 

Dyspnea, Pedal Edema, Radiating Pain, Rapid Heart Rate, Slow Heart Rate, Syncope

, Other





- Respiratory


Respiratory: As Per HPI.  absent: Cough, Dyspnea, Hemoptysis, Dyspnea on 

Exertion, Wheezing, Snoring, Stridor, Pain on Inspiration, Chest Congestion, 

Excessive Mucous Production, Change in Mucous Color, Pain with Coughing, Other





- Gastrointestinal


Gastrointestinal: As Per HPI.  absent: Abdominal Pain, Belching, Bloating, 

Change in Bowel Habits, Change in Stool Character, Coffee Ground Emesis, 

Constipation, Cramping, Diarrhea, Dyspepsia, Dysphagia, Early Satiety, 

Excessive Flatus, Fecal Incontinence, Heartburn, Hematemesis, Hematochezia, 

Loose Stools, Melena, Nausea, Odynophagia, Temesmus, Vomiting, Other





- Genitourinary


Genitourinary: As Per HPI.  absent: Change in Urinary Stream, Difficulty 

Urinating, Dysuria, Flank Pain, Hematuria, Pyuria, Nocturia, Urinary 

Incontinence, Urinary Frequency, Urinary Hesitance, Urinary Urgency, Voiding 

Freq/Small Amts, Freq UTI, Hx Renal/Bladder Calculi, Hx /Renal Surgery, 

Bladder Distension, Other





- Reproductive: Female


Reproductive:Female: As Per HPI.  absent: Amenorrhea, Amenorrhea/Birth Control, 

Currently Menstual, Cycle <21 Days, Cycle >35 Days, Cycle Variable, Menses 1-7 

Days, Menses >/= 8 Days, Menses Variable, Cycle > 4 Weeks Between, No Menses 

for 6 Months, Heavy Menses, Light Menses, Normal Menses, Spotting Between Cycles

, S/P Hysterectomy, Menopausal, Post Menopausal, Premenarche, Abnormal Vaginal 

Bleeding, Dysmenorrhea, Dyspareunia, Genital Lesions, Genital Pruritis, Pelvic 

Pain, Prolapse Symptoms, Sexual Dysfunction, Vaginal Discharge, Vaginal Dryness

, Vaginal Odor, Vaginal Pruritis, Other





- Menstruation


Menstruation: As Per HPI.  absent: Amenorrhea, Amenorrhea/Birth Control, 

Currently Menstual, Cycle <21 Days, Cycle >35 Days, Cycle Variable, Menses 1-7 

Days, Menses >/= 8 Days, Menses Variable, Cycle > 4 Weeks Between, No Menses 

for 6 Months, Heavy Menses, Light Menses, Normal Menses, Spotting Between Cycles

, S/P Hysterectomy, Menopausal, Post Menopausal, Premenarche, Abnormal Vaginal 

Bleeding, Dysmenorrhea, Other





- Musculoskeletal


Musculoskeletal: As Per HPI.  absent: Abnormal Gait, Arthralgias, Atrophy, Back 

Pain, Deformity, Joint Swelling, Limited Range of Motion, Loss of Height, 

Muscle Cramps, Muscle Weakness, Myalgias, Neck Pain, Numbness, Radiating Pain 

into Limb, Stiffness, Tingling, Other





- Integumentary


Integumentary: As Per HPI.  absent: Acne, Alopecia, Bleeding Lesions, Change in 

Hair, Change in Nails, Change in Pigmentation, Changing Lesions, Dry Skin, 

Erythema, Furuncle, Hirsutism, Lesions, New Lesions, Non-Healing Lesions, 

Photosensitivity, Pruritus, Rash, Skin Pain, Skin Ulcer, Sores, Striae, Swelling

, Unusual Bruising, Wounds, Jaundice, Other





- Neurological


Neurological: As Per HPI.  absent: Abnormal Gait, Abnormal Hearing, Abnormal 

Movements, Abnormal Speech, Behavioral Changes, Burning Sensations, Confusion, 

Convulsions, Disequilibrium, Dizziness, Numbness, Focal Weakness, Frequent Falls

, Headaches, Lack of Coordination, Loss of Vision, Memory Loss, Paresthesias, 

Radicular Pain, Restless Legs, Sensory Deficit, Syncope, Tingling, Tremor, 

Vertigo, Weakness, Other Visual Disturbances, Other





- Psychiatric


Psychiatric: As Per HPI.  absent: Abnormal Sleep Pattern, Anhedonia, Anxiety, 

Auditory Hallucinations, Behavioral Changes, Change in Appetite, Change in 

Libido, Confusion, Depression, Difficulty Concentrating, Hallucinations, 

Homicidal Ideation, Hopelessness, Irritability, Memory Loss, Mood Swings, Panic 

Attacks, Paranoia, Suicidal Ideation, Visual Hallucinations, Tactile 

Hallucinations, Other





- Endocrine


Endocrine: As Per HPI.  absent: Change in Body Appearance, Change in Libido, 

Cold Intolorance, Deepening of Voice, Excessive Sweating, Fatigue, Flushing, 

Heat Intolorance, Increase in Ring/Shoe/Hat Size, Palpitations, Polydipsia, 

Polyphagia, Polyuria, Other





- Hematologic/Lymphatic


Hematologic: As Per HPI.  absent: Easy Bleeding, Easy Bruising, Lymphadenopathy

, Other





Past Patient History





- Infectious Disease


Hx of Infectious Diseases: None





- Past Medical History & Family History


Past Medical History?: Yes





- Past Social History


Smoking Status: Never Smoked


Chewing Tobacco Use: No


Cigar Use: No


Alcohol: None


Drugs: Denies


Home Situation {Lives}: With Family





- CARDIAC


Hx Atrial Fibrillation: Yes


Hx Cardia Arrhythmia: Yes


Hx Hypercholesterolemia: Yes


Hx Hypertension: Yes


Hx Pacemaker: Yes (6/16/16)





- PULMONARY


Hx Asthma: Yes





- NEUROLOGICAL


Hx Neurological Disorder: Yes


Hx Syncope: Yes


Hx Vertigo: Yes





- HEENT


Hx HEENT Problems: Yes





- RENAL


Hx Chronic Kidney Disease: No





- ENDOCRINE/METABOLIC


Hx Endocrine Disorders: No





- HEMATOLOGICAL/ONCOLOGICAL


Hx Blood Disorders: No





- INTEGUMENTARY


Hx Dermatological Problems: No





- MUSCULOSKELETAL/RHEUMATOLOGICAL


Hx Musculoskeletal Disorders: Yes


Hx Falls: No





- GASTROINTESTINAL


Hx Gastritis: Yes





- GENITOURINARY/GYNECOLOGICAL


Hx Genitourinary Disorders: No





- PSYCHIATRIC


Hx Anxiety: Yes


Hx Depression: Yes


Hx Substance Use: No





- SURGICAL HISTORY


Hx Cholecystectomy: Yes





- ANESTHESIA


Hx Anesthesia: Yes


Hx Anesthesia Reactions: No


Hx Malignant Hyperthermia: No





Meds


Allergies/Adverse Reactions: 


 Allergies











Allergy/AdvReac Type Severity Reaction Status Date / Time


 


No Known Allergies Allergy   Verified 02/11/18 11:28














- Medications


Medications: 


 Current Medications





Acetaminophen (Tylenol 325mg Tab)  650 mg PO Q6 PRN


   PRN Reason: Pain, moderate (4-7)


Aspirin (Ecotrin)  81 mg PO DAILY [facility]


Carvedilol (Coreg)  6.25 mg PO BID [facility]


Diltiazem HCl (Cardizem Cd)  60 mg PO Q6 [facility]


Famotidine (Pepcid)  20 mg PO BID [facility]


Home Med (Atorvastatin Calcium [Atorvastatin Calcium])  10 mg PO HS [facility]


Hydrochlorothiazide (Microzide)  12.5 mg PO DAILY [facility]


Levothyroxine Sodium (Synthroid)  75 mcg PO DAILY@0630 [facility]


Losartan Potassium (Cozaar)  50 mg PO DAILY [facility]


Pantoprazole Sodium (Protonix Ec Tab)  40 mg PO DAILY [facility]


Rivaroxaban (Xarelto)  15 mg PO BID [facility]











Physical Exam





- Constitutional


Appears: Well





- Head Exam


Head Exam: ATRAUMATIC, NORMAL INSPECTION, NORMOCEPHALIC





- Eye Exam


Eye Exam: EOMI, Normal appearance, PERRL.  absent: Conjunctival injection, 

Nystagmus, Periorbital swelling, Periorbital tenderness, Scleral icterus


Pupil Exam: NORMAL ACCOMODATION, PERRL.  absent: Fixed, Irregular, Miosis, 

Mydriatic, Unequal





- ENT Exam


ENT Exam: Mucous Membranes Moist, Normal Exam.  absent: Mucous Membranes Dry, 

Normal External Ear Exam, Normal Oropharynx, TM's Normal Bilaterally





- Neck Exam


Neck exam: Positive for: Normal Inspection.  Negative for: Full Rom, 

Lymphadenopathy, Meningismus, Tenderness, Thyromegaly





- Respiratory Exam


Respiratory Exam: Clear to Auscultation Bilateral, NORMAL BREATHING PATTERN.  

absent: Accessory Muscle Use, Chest Wall Tenderness, Decreased Breath Sounds, 

Prolonged Expiratory Phase, Rales, Rhonchi, Wheezes, Respiratory Distress, 

Stridor





- Cardiovascular Exam


Cardiovascular Exam: Tachycardia, Irregular Rhythm, +S1, +S2, Systolic Murmur.  

absent: Bradycardia, Clicks, Diastolic murmur, Gallop, REGULAR RHYTHM, JVD, RRR

, Rubs, +S4





- GI/Abdominal Exam


GI & Abdominal Exam: Normal Bowel Sounds, Soft.  absent: Bruit, Diminished 

Bowel Sounds, Distended, Firm, Guarding, Hernia, Hyperactive Bowel Sounds, 

Hypoactive Bowel Sounds, Mass, Organomegaly, Pulsatile Mass, Rebound, Rigid, 

Tenderness





- Rectal Exam


Rectal Exam: Deferred





- Extremities Exam


Extremities exam: Positive for: normal inspection.  Negative for: calf 

tenderness, full ROM, joint swelling, normal capillary refill, pedal edema, 

tenderness, pedal pulses present





- Back Exam


Back exam: NORMAL INSPECTION.  absent: CVA tenderness (L), CVA tenderness (R), 

FULL ROM, muscle spasm, paraspinal tenderness, rash noted, tenderness, 

vertebral tenderness





- Neurological Exam


Neurological exam: Alert, CN II-XII Intact, Normal Gait, Oriented x3, Reflexes 

Normal





- Psychiatric Exam


Psychiatric exam: Normal Affect, Normal Mood





- Skin


Skin Exam: Dry, Intact, Normal Color, Warm





Results





- Vital Signs


Recent Vital Signs: 


 Last Vital Signs











Temp  98.6 F   02/11/18 11:25


 


Pulse  89   02/11/18 13:58


 


Resp  17   02/11/18 13:58


 


BP  145/76   02/11/18 13:58


 


Pulse Ox  96   02/11/18 13:58














- Labs


Result Diagrams: 


 02/11/18 11:48





 02/11/18 11:48


Labs: 


 Laboratory Results - last 24 hr











  02/11/18 02/11/18





  11:48 11:48


 


WBC  7.2 


 


RBC  3.87 


 


Hgb  12.2 


 


Hct  35.8 


 


MCV  92.6 


 


MCH  31.6 H 


 


MCHC  34.2 


 


RDW  13.3 


 


Plt Count  184 


 


MPV  10.7 


 


Neut % (Auto)  69.5 


 


Lymph % (Auto)  18.7 L 


 


Mono % (Auto)  9.2 


 


Eos % (Auto)  1.9 


 


Baso % (Auto)  0.7 


 


Neut # (Auto)  5.0 


 


Lymph # (Auto)  1.3 


 


Mono # (Auto)  0.7 


 


Eos # (Auto)  0.1 


 


Baso # (Auto)  0.0 


 


Sodium   136


 


Potassium   3.9


 


Chloride   99


 


Carbon Dioxide   26


 


Anion Gap   15


 


BUN   25 H


 


Creatinine   1.1


 


Est GFR ( Amer)   59


 


Est GFR (Non-Af Amer)   48


 


Random Glucose   93


 


Calcium   9.3


 


Total Bilirubin   0.4


 


AST   29


 


ALT   22


 


Alkaline Phosphatase   105


 


Troponin I   < 0.0120


 


NT-Pro-B Natriuret Pep   165


 


Total Protein   7.2


 


Albumin   4.0


 


Globulin   3.2


 


Albumin/Globulin Ratio   1.2














- EKG Data


EKG Interpreted by: Myself


EKG shows normal: Sinus rhythm


Rate: Normal





Assessment & Plan


(1) Chest pain


Status: Acute   





(2) Palpitations


Status: Acute   





(3) History of pacemaker


Status: Acute   





(4) Paroxysmal a-fib


Status: Acute   





- Assessment and Plan (Free Text)


Plan: 





pt with palp and cp today.  ekg shows sr.  monitor currently reveals Mobitz 1, 

with intermittent short bursts of a-flutter at 100bpm, as well as sinus pauses 

usually after fluter ceases.  would get echo, give ccb's, give mag, repleat k 

to 4.5.  CHECK TFTS.  she has ruled out.

## 2018-02-12 VITALS — RESPIRATION RATE: 20 BRPM

## 2018-02-12 LAB
ALBUMIN SERPL-MCNC: 4 G/DL (ref 3.5–5)
ALBUMIN/GLOB SERPL: 1.3 {RATIO} (ref 1–2.1)
ALT SERPL-CCNC: 22 U/L (ref 9–52)
APTT BLD: 35 SECONDS (ref 21–34)
AST SERPL-CCNC: 27 U/L (ref 14–36)
BASOPHILS # BLD AUTO: 0 K/UL (ref 0–0.2)
BASOPHILS NFR BLD: 0.6 % (ref 0–2)
BUN SERPL-MCNC: 35 MG/DL (ref 7–17)
CALCIUM SERPL-MCNC: 8.8 MG/DL (ref 8.6–10.4)
CK MB SERPL-MCNC: 0.78 NG/ML (ref 0–3.38)
EOSINOPHIL # BLD AUTO: 0.2 K/UL (ref 0–0.7)
EOSINOPHIL NFR BLD: 2.4 % (ref 0–4)
ERYTHROCYTE [DISTWIDTH] IN BLOOD BY AUTOMATED COUNT: 13 % (ref 11.5–14.5)
GFR NON-AFRICAN AMERICAN: 40
HDLC SERPL-MCNC: 40 MG/DL (ref 30–70)
HGB BLD-MCNC: 12.7 G/DL (ref 11–16)
INR PPP: 1.1
LDLC SERPL-MCNC: 130 MG/DL (ref 0–129)
LYMPHOCYTES # BLD AUTO: 1.4 K/UL (ref 1–4.3)
LYMPHOCYTES NFR BLD AUTO: 19.7 % (ref 20–40)
MAGNESIUM SERPL-MCNC: 2.1 MG/DL (ref 1.6–2.3)
MCH RBC QN AUTO: 31.5 PG (ref 27–31)
MCHC RBC AUTO-ENTMCNC: 33.6 G/DL (ref 33–37)
MCV RBC AUTO: 93.7 FL (ref 81–99)
MONOCYTES # BLD: 0.6 K/UL (ref 0–0.8)
MONOCYTES NFR BLD: 8.4 % (ref 0–10)
NEUTROPHILS # BLD: 5 K/UL (ref 1.8–7)
NEUTROPHILS NFR BLD AUTO: 68.9 % (ref 50–75)
NRBC BLD AUTO-RTO: 0 % (ref 0–2)
PLATELET # BLD: 209 K/UL (ref 130–400)
PMV BLD AUTO: 10.9 FL (ref 7.2–11.7)
PROTHROMBIN TIME: 12.8 SECONDS (ref 9.7–12.2)
RBC # BLD AUTO: 4.04 MIL/UL (ref 3.8–5.2)
T4 SERPL-MCNC: 10.5 UG/DL (ref 5.5–11)
WBC # BLD AUTO: 7.3 K/UL (ref 4.8–10.8)

## 2018-02-12 RX ADMIN — MAGNESIUM SULFATE IN DEXTROSE SCH: 10 INJECTION, SOLUTION INTRAVENOUS at 00:45

## 2018-02-12 RX ADMIN — PANTOPRAZOLE SODIUM SCH MG: 40 TABLET, DELAYED RELEASE ORAL at 10:08

## 2018-02-12 RX ADMIN — MAGNESIUM SULFATE IN DEXTROSE SCH: 10 INJECTION, SOLUTION INTRAVENOUS at 01:08

## 2018-02-12 NOTE — CP.PCM.PN
Subjective





- Date & Time of Evaluation


Date of Evaluation: 02/12/18


Time of Evaluation: 08:20





- Subjective


Subjective: 





no new complaints.





Objective





- Vital Signs/Intake and Output


Vital Signs (last 24 hours): 


 











Temp Pulse Resp BP Pulse Ox


 


 97.8 F   65   16   94/50 L  97 


 


 02/12/18 06:19  02/12/18 06:19  02/12/18 06:19  02/12/18 06:19  02/12/18 06:19











- Medications


Medications: 


 Current Medications





Acetaminophen (Tylenol 325mg Tab)  650 mg PO Q6 PRN


   PRN Reason: Pain, moderate (4-7)


Aspirin (Ecotrin)  81 mg PO DAILY [facility]


Carvedilol (Coreg)  6.25 mg PO BID [facility]


   Last Admin: 02/11/18 23:02 Dose:  Not Given


Diltiazem HCl (Cardizem)  60 mg PO Q6 [facility]


   Last Admin: 02/12/18 06:17 Dose:  60 mg


Hydrochlorothiazide (Microzide)  12.5 mg PO DAILY [facility]


Levothyroxine Sodium (Synthroid)  75 mcg PO DAILY@0630 [facility]


Losartan Potassium (Cozaar)  50 mg PO DAILY [facility]


Pantoprazole Sodium (Protonix Ec Tab)  40 mg PO DAILY [facility]


Rivaroxaban (Xarelto)  15 mg PO BID [facility]


   Last Admin: 02/11/18 23:02 Dose:  15 mg


Rosuvastatin Calcium (Crestor)  5 mg PO [facility]


   Last Admin: 02/11/18 23:03 Dose:  5 mg











- Labs


Labs: 


 





 02/12/18 06:20 





 02/12/18 06:20 





 











PT  12.8 SECONDS (9.7-12.2)  H  02/12/18  06:20    


 


INR  1.1   02/12/18  06:20    


 


APTT  35 SECONDS (21-34)  H  02/12/18  06:20    














- Constitutional


Appears: Non-toxic





- Head Exam


Head Exam: NORMAL INSPECTION





- Eye Exam


Eye Exam: Normal appearance





- ENT Exam


ENT Exam: Mucous Membranes Moist





- Neck Exam


Neck Exam: Full ROM





- Respiratory Exam


Respiratory Exam: Decreased Breath Sounds





- Cardiovascular Exam


Cardiovascular Exam: Irregular Rhythm





- GI/Abdominal Exam


GI & Abdominal Exam: Normal Bowel Sounds





- Rectal Exam


Rectal Exam: Deferred





- Extremities Exam


Extremities Exam: Pedal Edema





- Back Exam


Back Exam: NORMAL INSPECTION





- Neurological Exam


Neurological Exam: Alert





- Psychiatric Exam


Psychiatric exam: Normal Affect





- Skin


Skin Exam: Normal Color





Assessment and Plan


(1) Afib


Assessment & Plan: 


recommend echocardiogram.  heart rate improved on cardizem.  anticoagulation to 

reduce stroke risk.


Status: Acute

## 2018-02-12 NOTE — CARD
--------------- APPROVED REPORT --------------





EKG Measurement

Heart [id number]

ID [id number]

[id number]

[id number]

[id number]



<Conclusion>

Normal sinus rhythm

Normal ECG

## 2018-02-12 NOTE — CP.PCM.PN
Subjective





- Date & Time of Evaluation


Date of Evaluation: 02/12/18


Time of Evaluation: 16:55





- Subjective


Subjective: 





echocardiogram  reviewed.





Normal left ventricular function.


Recommend d/c home, outpatient follow up





Objective





- Vital Signs/Intake and Output


Vital Signs (last 24 hours): 


 











Temp Pulse Resp BP Pulse Ox


 


 97.5 F L  60   16   125/54 L  97 


 


 02/12/18 14:35  02/12/18 14:35  02/12/18 14:35  02/12/18 14:35  02/12/18 14:35











- Medications


Medications: 


 Current Medications





Acetaminophen (Tylenol 325mg Tab)  650 mg PO Q6 PRN


   PRN Reason: Pain, moderate (4-7)


Aspirin (Ecotrin)  81 mg PO DAILY [facility]


   Last Admin: 02/12/18 10:19 Dose:  81 mg


Carvedilol (Coreg)  6.25 mg PO BID [facility]


   Last Admin: 02/12/18 10:07 Dose:  6.25 mg


Diltiazem HCl (Cardizem)  60 mg PO Q6 [facility]


   Last Admin: 02/12/18 13:11 Dose:  60 mg


Hydrochlorothiazide (Microzide)  12.5 mg PO DAILY [facility]


   Last Admin: 02/12/18 10:50 Dose:  12.5 mg


Levothyroxine Sodium (Synthroid)  75 mcg PO DAILY@0630 [facility]


   Last Admin: 02/12/18 07:30 Dose:  75 mcg


Losartan Potassium (Cozaar)  50 mg PO DAILY [facility]


   Last Admin: 02/12/18 10:19 Dose:  50 mg


Pantoprazole Sodium (Protonix Ec Tab)  40 mg PO DAILY [facility]


   Last Admin: 02/12/18 10:08 Dose:  40 mg


Rivaroxaban (Xarelto)  15 mg PO BID [facility]


   Last Admin: 02/12/18 10:07 Dose:  15 mg


Rosuvastatin Calcium (Crestor)  5 mg PO HS [facility]


   Last Admin: 02/11/18 23:03 Dose:  5 mg











- Labs


Labs: 


 





 02/12/18 06:20 





 02/12/18 06:20 





 











PT  12.8 SECONDS (9.7-12.2)  H  02/12/18  06:20    


 


INR  1.1   02/12/18  06:20    


 


APTT  35 SECONDS (21-34)  H  02/12/18  06:20    














Assessment and Plan


(1) Afib


Status: Acute

## 2018-02-12 NOTE — CARD
--------------- APPROVED REPORT --------------





EXAM: Two-dimensional and M-mode echocardiogram with Doppler and 

color Doppler.



2D DIMENSIONS 

IVSd1.1   (0.7-1.1cm)LVDd3.6   (3.9-5.9cm)

PWd1.1   (0.7-1.1cm)LVDs1.9   (2.5-4.0cm)

FS (%) 47.3   %LVEF (%)65.0   (>50%)



M-Mode DIMENSIONS 

Left Atrium (MM)3.31   (2.5-4.0cm)Aortic Root3.18   (2.2-3.7cm)

Aortic Cusp Exc.1.28   (1.5-2.0cm)



Mitral Valve

MV E Nftodpib38.0cm/sMV A Uqsrvpct283.6cm/sE/A ratio0.9



TDI

E/Lateral E'0.0E/Medial E'0.0



Tricuspid Valve

TR Peak [id number]/sTR Peak [id number]



 LEFT VENTRICLE 

The left ventricle is normal size.

There is normal left ventricular wall thickness. 

The left ventricular function is normal.

The left ventricular ejection fraction is within the normal range. 55%

No regional wall motion abnormalities noted.

The left ventricular diastolic function is indeterminate.

No left ventricle thrombus noted on this study.

There is no ventricular septal defect visualized.

There is no left ventricular aneurysm. 

There is no mass noted in the left ventricle.



 RIGHT VENTRICLE 

The right ventricle is normal size.

There is normal right ventricular wall thickness. A ppm wire is noted.

The right ventricular systolic function is normal.



 ATRIA 

The left atrium size is mildly dilated.

The right atrium size is normal.

The interatrial septum is intact with no evidence for an atrial 

septal defect.



 AORTIC VALVE 

The aortic valve is normal in structure and function.

No aortic regurgitation is present. 

There is no aortic valvular stenosis. 

There is no aortic valvular vegetation.



 MITRAL VALVE 

The mitral valve is normal in structure and function.

There is no evidence of mitral valve prolapse.

There is no mitral valve stenosis. 

There is no mitral valve regurgitation noted.



 TRICUSPID VALVE 

The tricuspid valve is normal in structure and function.

There is mild tricuspid valve regurgitation noted.

There is no tricuspid valve prolapse or vegetation.

There is no tricuspid valve stenosis. 



 PULMONIC VALVE 

The pulmonary valve is normal in structure and function.

There is no pulmonic valvular regurgitation. 

There is no pulmonic valvular stenosis.



 GREAT VESSELS 

The aortic root is normal in size.

The ascending aorta is normal in size.

The pulmonary artery is normal.

The IVC is normal in size and collapses >50% with inspiration.



 PERICARDIAL EFFUSION 

The pericardium appears normal.

There is no pleural effusion.



<Conclusion>

Normal LV systolic function.The left atrium size is mildly dilated.

Normal Doppler.

## 2018-02-12 NOTE — CP.PCM.PN
Subjective





- Date & Time of Evaluation


Date of Evaluation: 02/12/18


Time of Evaluation: 09:45





- Subjective


Subjective: 





PGY1 Medicine Note for Dr. Soto





Patient seen and examined at bedside this morning. Patient's symptoms have 

improved and patient is resting comfortably in bed. Patient denies palpitation 

and her chest pain has resolved. She is no longer dizzy and has been able to 

stand and walk on her own. She denies fevers, chills, nausea, vomiting, diarrhea

, constipation, chest pain, abdominal pain, urinary symptoms, lightheadedness, 

dizziness or headaches. 





Objective





- Vital Signs/Intake and Output


Vital Signs (last 24 hours): 


 











Temp Pulse Resp BP Pulse Ox


 


 97.5 F L  60   20   119/67   96 


 


 02/12/18 16:00  02/12/18 19:09  02/12/18 16:00  02/12/18 19:09  02/12/18 16:00











- Medications


Medications: 


 Current Medications





Acetaminophen (Tylenol 325mg Tab)  650 mg PO Q6 PRN


   PRN Reason: Pain, moderate (4-7)


Aspirin (Ecotrin)  81 mg PO DAILY [facility]


   Last Admin: 02/12/18 10:19 Dose:  81 mg


Carvedilol (Coreg)  6.25 mg PO BID [facility]


   Last Admin: 02/12/18 19:00 Dose:  6.25 mg


Diltiazem HCl (Cardizem)  60 mg PO Q6 [facility]


   Last Admin: 02/12/18 19:00 Dose:  60 mg


Hydrochlorothiazide (Microzide)  12.5 mg PO DAILY [facility]


   Last Admin: 02/12/18 10:50 Dose:  12.5 mg


Levothyroxine Sodium (Synthroid)  75 mcg PO DAILY@0630 [facility]


   Last Admin: 02/12/18 07:30 Dose:  75 mcg


Losartan Potassium (Cozaar)  50 mg PO DAILY [facility]


   Last Admin: 02/12/18 10:19 Dose:  50 mg


Pantoprazole Sodium (Protonix Ec Tab)  40 mg PO DAILY [facility]


   Last Admin: 02/12/18 10:08 Dose:  40 mg


Rivaroxaban (Xarelto)  15 mg PO BID [facility]


   Last Admin: 02/12/18 19:00 Dose:  15 mg


Rosuvastatin Calcium (Crestor)  5 mg PO HS [facility]


   Last Admin: 02/11/18 23:03 Dose:  5 mg











- Labs


Labs: 


 





 02/12/18 06:20 





 02/12/18 06:20 





 











PT  12.8 SECONDS (9.7-12.2)  H  02/12/18  06:20    


 


INR  1.1   02/12/18  06:20    


 


APTT  35 SECONDS (21-34)  H  02/12/18  06:20    














- Constitutional


Appears: Non-toxic, No Acute Distress





- Head Exam


Head Exam: ATRAUMATIC, NORMOCEPHALIC





- Eye Exam


Eye Exam: EOMI, Normal appearance





- ENT Exam


ENT Exam: Mucous Membranes Moist





- Respiratory Exam


Respiratory Exam: Clear to Ausculation Bilateral, NORMAL BREATHING PATTERN.  

absent: Accessory Muscle Use, Rales, Rhonchi, Wheezes, Respiratory Distress





- Cardiovascular Exam


Cardiovascular Exam: REGULAR RHYTHM, +S1





- GI/Abdominal Exam


GI & Abdominal Exam: Soft, Normal Bowel Sounds.  absent: Distended, Firm, 

Guarding, Rigid, Tenderness





- Extremities Exam


Extremities Exam: absent: Calf Tenderness, Pedal Edema





- Neurological Exam


Neurological Exam: Alert, Awake, Oriented x3





- Psychiatric Exam


Psychiatric exam: Normal Affect, Normal Mood





- Skin


Skin Exam: Dry, Warm





Assessment and Plan





- Assessment and Plan (Free Text)


Plan: 





A fib with atrial and ventricular pacing


- Pt will receive 10 mg IVP of cardizem stat


- She will then be given cardizem po 60 mg q6


-Continue home medication xeralto 15 mg po bid, aspirin 81 mg po qd


- Cardiology, Dr. Iniguez is consulted.  As patient has a dual chamber 

pacemaker, her HR should be controlled.  Tachycardia likely due to an aberrant 

rhythm.  


* Recommends discharge home with outpatient follow up





Chest pain


- Initial troponins and EKG are normal


- Trops negative x2


- Continue home medications: Aspirin, Cozaar, Coreg 6.25 mg po bid


- CXR 2/11 - Left hilar prominence. Prominent lung markings involving bilateral 

lung bases. Scattered probable calcified granulomas. Dual lead left-sided 

pacemaker.  Mild cardiomegaly.  Ectatic aorta containing dense atherosclerotic 

calcifications.


- ECHO 2/12/18 - Normal LV systolic function ~55%. The left atrium size is 

mildly dilated. Normal Doppler.





Hypothyroid


- continue home medication synthroid 75 mcg po qd


- TSH 2.96 and T4 10.5


- HgbA1c 5.8





HLD


- Continue home medication Atorvastatin 10 mg po hs


- HDL 40 and 


- Triglycerides 132





Prophylaxis


- Continue home medication protonix


- Xeralto





Dispo: Will plan for discharge in the morning 





Case discussed with Dr. Charles Solano [place] PGY1

## 2018-02-13 VITALS — OXYGEN SATURATION: 95 % | DIASTOLIC BLOOD PRESSURE: 61 MMHG | TEMPERATURE: 97.6 F | SYSTOLIC BLOOD PRESSURE: 114 MMHG

## 2018-02-13 VITALS — HEART RATE: 59 BPM

## 2018-02-13 LAB
ALBUMIN SERPL-MCNC: 4 G/DL (ref 3.5–5)
ALBUMIN/GLOB SERPL: 1.3 {RATIO} (ref 1–2.1)
ALT SERPL-CCNC: 26 U/L (ref 9–52)
APTT BLD: 42 SECONDS (ref 21–34)
AST SERPL-CCNC: 22 U/L (ref 14–36)
BASOPHILS # BLD AUTO: 0 K/UL (ref 0–0.2)
BASOPHILS NFR BLD: 0.4 % (ref 0–2)
BUN SERPL-MCNC: 41 MG/DL (ref 7–17)
CALCIUM SERPL-MCNC: 9.1 MG/DL (ref 8.6–10.4)
EOSINOPHIL # BLD AUTO: 0.2 K/UL (ref 0–0.7)
EOSINOPHIL NFR BLD: 2.7 % (ref 0–4)
ERYTHROCYTE [DISTWIDTH] IN BLOOD BY AUTOMATED COUNT: 13 % (ref 11.5–14.5)
GFR NON-AFRICAN AMERICAN: 37
HGB BLD-MCNC: 12.3 G/DL (ref 11–16)
INR PPP: 2.2
LYMPHOCYTES # BLD AUTO: 1.3 K/UL (ref 1–4.3)
LYMPHOCYTES NFR BLD AUTO: 18.3 % (ref 20–40)
MCH RBC QN AUTO: 31.9 PG (ref 27–31)
MCHC RBC AUTO-ENTMCNC: 34.5 G/DL (ref 33–37)
MCV RBC AUTO: 92.3 FL (ref 81–99)
MONOCYTES # BLD: 0.5 K/UL (ref 0–0.8)
MONOCYTES NFR BLD: 7.6 % (ref 0–10)
NEUTROPHILS # BLD: 5 K/UL (ref 1.8–7)
NEUTROPHILS NFR BLD AUTO: 71 % (ref 50–75)
NRBC BLD AUTO-RTO: 0 % (ref 0–2)
PLATELET # BLD: 200 K/UL (ref 130–400)
PMV BLD AUTO: 10.7 FL (ref 7.2–11.7)
PROTHROMBIN TIME: 25.6 SECONDS (ref 9.7–12.2)
RBC # BLD AUTO: 3.86 MIL/UL (ref 3.8–5.2)
T4 SERPL-MCNC: 12 UG/DL (ref 5.5–11)
WBC # BLD AUTO: 7 K/UL (ref 4.8–10.8)

## 2018-02-13 RX ADMIN — PANTOPRAZOLE SODIUM SCH MG: 40 TABLET, DELAYED RELEASE ORAL at 09:29

## 2018-02-13 NOTE — CP.PCM.DIS
Provider





- Provider


Date of Admission: 


02/11/18 13:12





Attending physician: 


Alton Soto Jr, MD





Consults: 





Cardiology - Dr. Peggy Iniguez


Time Spent in preparation of Discharge (in minutes): 40





Hospital Course





- Lab Results


Lab Results: 


 Most Recent Lab Values











WBC  7.0 K/uL (4.8-10.8)   02/13/18  08:35    


 


RBC  3.86 Mil/uL (3.80-5.20)   02/13/18  08:35    


 


Hgb  12.3 g/dL (11.0-16.0)   02/13/18  08:35    


 


Hct  35.6 % (34.0-47.0)   02/13/18  08:35    


 


MCV  92.3 fL (81.0-99.0)   02/13/18  08:35    


 


MCH  31.9 pg (27.0-31.0)  H  02/13/18  08:35    


 


MCHC  34.5 g/dL (33.0-37.0)   02/13/18  08:35    


 


RDW  13.0 % (11.5-14.5)   02/13/18  08:35    


 


Plt Count  200 K/uL (130-400)   02/13/18  08:35    


 


MPV  10.7 fL (7.2-11.7)   02/13/18  08:35    


 


Neut % (Auto)  71.0 % (50.0-75.0)   02/13/18  08:35    


 


Lymph % (Auto)  18.3 % (20.0-40.0)  L  02/13/18  08:35    


 


Mono % (Auto)  7.6 % (0.0-10.0)   02/13/18  08:35    


 


Eos % (Auto)  2.7 % (0.0-4.0)   02/13/18  08:35    


 


Baso % (Auto)  0.4 % (0.0-2.0)   02/13/18  08:35    


 


Neut # (Auto)  5.0 K/uL (1.8-7.0)   02/13/18  08:35    


 


Lymph # (Auto)  1.3 K/uL (1.0-4.3)   02/13/18  08:35    


 


Mono # (Auto)  0.5 K/uL (0.0-0.8)   02/13/18  08:35    


 


Eos # (Auto)  0.2 K/uL (0.0-0.7)   02/13/18  08:35    


 


Baso # (Auto)  0.0 K/uL (0.0-0.2)   02/13/18  08:35    


 


PT  25.6 SECONDS (9.7-12.2)  H D 02/13/18  08:35    


 


INR  2.2  D 02/13/18  08:35    


 


APTT  42 SECONDS (21-34)  H D 02/13/18  08:35    


 


Sodium  137 mmol/L (132-148)   02/13/18  08:35    


 


Potassium  4.2 mmol/L (3.6-5.2)   02/13/18  08:35    


 


Chloride  98 mmol/L ()   02/13/18  08:35    


 


Carbon Dioxide  29 mmol/L (22-30)   02/13/18  08:35    


 


Anion Gap  15  (10-20)   02/13/18  08:35    


 


BUN  41 mg/dL (7-17)  H  02/13/18  08:35    


 


Creatinine  1.4 mg/dL (0.7-1.2)  H  02/13/18  08:35    


 


Est GFR ( Amer)  44   02/13/18  08:35    


 


Est GFR (Non-Af Amer)  37   02/13/18  08:35    


 


Random Glucose  102 mg/dL ()   02/13/18  08:35    


 


Hemoglobin A1c  5.8 % (4.2-6.5)   02/12/18  06:20    


 


Calcium  9.1 mg/dl (8.6-10.4)   02/13/18  08:35    


 


Phosphorus  3.9 mg/dL (2.5-4.5)   02/12/18  01:01    


 


Magnesium  2.1 mg/dL (1.6-2.3)   02/12/18  01:01    


 


Total Bilirubin  0.6 mg/dL (0.2-1.3)   02/13/18  08:35    


 


AST  22 U/L (14-36)   02/13/18  08:35    


 


ALT  26 U/L (9-52)   02/13/18  08:35    


 


Alkaline Phosphatase  115 U/L ()   02/13/18  08:35    


 


Total Creatine Kinase  67 U/L ()   02/12/18  01:01    


 


CK-MB (Mass)  0.78 ng/mL (0.0-3.38)   02/12/18  01:01    


 


Troponin I  < 0.0120 ng/mL (0.00-0.120)   02/12/18  01:01    


 


NT-Pro-B Natriuret Pep  165 pg/mL (0-900)   02/11/18  11:48    


 


Total Protein  7.2 g/dL (6.3-8.3)   02/13/18  08:35    


 


Albumin  4.0 g/dL (3.5-5.0)   02/13/18  08:35    


 


Globulin  3.2 gm/dL (2.2-3.9)   02/13/18  08:35    


 


Albumin/Globulin Ratio  1.3  (1.0-2.1)   02/13/18  08:35    


 


Triglycerides  132 mg/dL (0-149)  D 02/12/18  06:20    


 


Cholesterol  204 mg/dL (0-199)  H  02/12/18  06:20    


 


LDL Cholesterol Direct  130 mg/dL (0-129)  H  02/12/18  06:20    


 


HDL Cholesterol  40 mg/dL (30-70)   02/12/18  06:20    


 


Thyroxine (T4)  12.0 ug/dL (5.5-11.0)  H  02/13/18  08:35    


 


TSH 3rd Generation  2.55 mIU/L (0.46-4.68)   02/13/18  08:35    














- Hospital Course


Hospital Course: 





As per admission documentation,





77 year old female with past medical history of HTN, HLD, Anxiety/ depression, 

asthma, gastritis, hypothyroid, a fib with dual chamber pacemaker currently on 

Xeralto is admitted for palpitations and chest pain.  Patient states that 

earlier in the morning, she was sitting eating breakfast when all of a sudden 

she developed chest pain located substrenally radiating to left side and to 

left shoulder.  In the ED, troponins and EKG were normal.  On monitor, pt is 

noted to have atrial pacing with a HR of 100. Patient states she took all of 

her home medications this morning.  Pt denies having any SOB, abd pain, N/V/D/C

, cough, F/C.





Hospital course,





Patient was admitted to the hospital for rapid a fib with atrial and 

ventricular pacemaker. Dr. Peggy Iniguez, Cardiology, was consulted.  ACS 

was ruled out with troponins negative x3 and normal EKG. 





CXR 2/11 - Left hilar prominence. Prominent lung markings involving bilateral 

lung bases. Scattered probable calcified granulomas. Dual lead left-sided 

pacemaker.  Mild cardiomegaly.  Ectatic aorta containing dense atherosclerotic 

calcifications.





ECHO 2/12/18 - Normal LV systolic function ~55%. The left atrium size is mildly 

dilated. Normal Doppler.





Patient responded to 10mg IVP of Cardizem in ED. Upon review of ECHO, patient 

was cleared for discharge per Dr. Iniguez and instructed to follow up out-

patient. Patient's hospital stay had no acute events throughout her hospital 

stay. 





Discharge instructions,





Patient is to be discharged home per Dr. Soto. Patient is to continue taking 

her home medications as directed by her primary care physician, Dr. Archuleta. 

Patient is to follow up with her primary care within one week of being 

discharge. Patient is to follow up with her Cardiologist within one week of 

being discharged. If patient does not have a cardiologist, she is to follow up 

with Dr. Iniguez. If the patient begins to feel any new or worsening symptoms, 

please go to the nearest Emergency Room. 





No new prescriptions given





- Constitutional


Appears: Non-toxic, No Acute Distress





- Head Exam


Head Exam: ATRAUMATIC, NORMOCEPHALIC





- Eye Exam


Eye Exam: EOMI, Normal appearance





- ENT Exam


ENT Exam: Mucous Membranes Moist





- Respiratory Exam


Respiratory Exam: Clear to Ausculation Bilateral, NORMAL BREATHING PATTERN.  

absent: Accessory Muscle Use, Rales, Rhonchi, Wheezes, Respiratory Distress





- Cardiovascular Exam


Cardiovascular Exam: REGULAR RHYTHM, +S1





- GI/Abdominal Exam


GI & Abdominal Exam: Soft, Normal Bowel Sounds.  absent: Distended, Firm, 

Guarding, Rigid, Tenderness





- Extremities Exam


Extremities Exam: absent: Calf Tenderness, Pedal Edema





- Neurological Exam


Neurological Exam: Alert, Awake, Oriented x3





- Psychiatric Exam


Psychiatric exam: Normal Affect, Normal Mood





- Skin


Skin Exam: Dry, Warm





Discharge Exam





- Head Exam


Head Exam: ATRAUMATIC, NORMOCEPHALIC





Discharge Plan





- Follow Up Plan


Condition: GUARDED


Disposition: HOME/ ROUTINE


Instructions:  Atrial Fibrillation (DC), Chest Pain (DC), Palpitations (DC)


Additional Instructions: 


Patient is to be discharged home per Dr. Soto. Patient is to continue taking 

her home medications as directed by her primary care physician, Dr. Archuleta. 

Patient is to follow up with her primary care within one week of being 

discharge. Patient is to follow up with her Cardiologist within one week of 

being discharged. If patient does not have a cardiologist, she is to follow up 

with Dr. Iniguez. If the patient begins to feel any new or worsening symptoms, 

please go to the nearest Emergency Room. 





No new prescriptions given





Referrals: 


Peggy Iniguez MD [Staff Provider] -

## 2018-05-07 ENCOUNTER — HOSPITAL ENCOUNTER (EMERGENCY)
Dept: HOSPITAL 31 - C.ER | Age: 78
Discharge: HOME | End: 2018-05-07
Payer: MEDICARE

## 2018-05-07 VITALS — SYSTOLIC BLOOD PRESSURE: 112 MMHG | DIASTOLIC BLOOD PRESSURE: 75 MMHG | OXYGEN SATURATION: 98 % | TEMPERATURE: 97.9 F

## 2018-05-07 VITALS — HEART RATE: 60 BPM | RESPIRATION RATE: 16 BRPM

## 2018-05-07 VITALS — BODY MASS INDEX: 29.2 KG/M2

## 2018-05-07 DIAGNOSIS — M25.462: Primary | ICD-10-CM

## 2018-05-07 DIAGNOSIS — E78.00: ICD-10-CM

## 2018-05-07 DIAGNOSIS — I48.91: ICD-10-CM

## 2018-05-07 DIAGNOSIS — M17.9: ICD-10-CM

## 2018-05-07 DIAGNOSIS — I10: ICD-10-CM

## 2018-05-07 NOTE — C.PDOC
History Of Present Illness


76 y/o female with history of chronic knee pain bilaterally presents to ED with 

worsening left knee pain 5/10 and swelling developed after missing a step 3 

days ago. Patient reports she takes Ibuprofen intermittently for pain and 

denies direct trauma, loc, numbness or any other complaints at this time. 


Time Seen by Provider: 05/07/18 09:22


Chief Complaint (Nursing): Lower Extremity Problem/Injury


History Per: Patient


History/Exam Limitations: no limitations


Onset/Duration Of Symptoms: Days


Current Symptoms Are (Timing): Still Present





Past Medical History


Reviewed: Historical Data, Nursing Documentation, Vital Signs


Vital Signs: 


 Last Vital Signs











Temp  97.6 F   05/07/18 09:14


 


Pulse  60   05/07/18 09:14


 


Resp  16   05/07/18 09:14


 


BP  110/81   05/07/18 09:14


 


Pulse Ox  99   05/07/18 10:37














- Medical History


PMH: Anxiety, Asthma, Atrial Fibrillation, Cardia Arrhythmia, Depression, 

Gastritis, HTN, Hypercholesterolemia


Surgical History: Cholecystectomy, Pacemaker (6/16/16)





- [facility] Procedures








CATARAC PHACOEMULS/ASPIR (07/21/15)


INSERT LENS AT CATAR EXT (07/21/15)


INSERT OF MONITOR DEV INTO CHEST SUBCU/FASCIA, PERC APPROACH (06/14/16)


VACCINATION NEC (09/26/14)








Family History: States: No Known Family Hx





- Social History


Hx Tobacco Use: No


Hx Alcohol Use: No


Hx Substance Use: No





- Immunization History


Hx Tetanus Toxoid Vaccination: No


Hx Influenza Vaccination: Yes


Hx Pneumococcal Vaccination: Yes





Review Of Systems


Constitutional: Negative for: Fever, Chills


Gastrointestinal: Negative for: Nausea, Vomiting


Musculoskeletal: Positive for: Leg Pain


Skin: Negative for: Rash


Neurological: Negative for: Weakness, Numbness





Physical Exam





- Physical Exam


Appears: Non-toxic, No Acute Distress


Skin: Warm, Dry, No Rash


Head: Atraumatic, Normacephalic


Eye(s): bilateral: Normal Inspection


Oral Mucosa: Moist


Neck: Normal ROM, Supple


Cardiovascular: Rhythm Regular


Respiratory: Normal Breath Sounds, No Rales, No Rhonchi, No Wheezing


Extremity: Capillary Refill (<2 seconds), No Deformity, Swelling (to left knee)

, Other (Left knee warmer than right knee)


Neurological/Psych: Oriented x3, Normal Speech, Normal Cognition, Normal Motor, 

Normal Sensation


Gait: Steady





ED Course And Treatment


O2 Sat by Pulse Oximetry: 99 (RA)


Pulse Ox Interpretation: Normal


Progress Note: Tylenol and Motrin administered. Xray ordered.  Patient 

instructed f.u with PMD in 1-2 days





Disposition


Counseled Patient/Family Regarding: Studies Performed, Diagnosis, Need For 

Followup





- Disposition


Referrals: 


Derek Archuleta MD [Medical Doctor] - 


Disposition: HOME/ ROUTINE


Disposition Time: 10:33


Condition: GOOD


Prescriptions: 


Ibuprofen [Motrin] 1 tab PO TID PRN #30 tab


 PRN Reason: Pain


Instructions:  Osteoarthritis


Forms:  Gen Discharge Inst [place], [facility] ([place])





- Clinical Impression


Clinical Impression: 


 Joint swelling, Arthritis








- Scribe Statement


The provider has reviewed the documentation as recorded by the Josselynibbarrie Moore





All medical record entries made by the Josselynibe were at my direction and 

personally dictated by me. I have reviewed the chart and agree that the record 

accurately reflects my personal performance of the history, physical exam, 

medical decision making, and the department course for this patient. I have 

also personally directed, reviewed, and agree with the discharge instructions 

and disposition.

## 2018-05-07 NOTE — C.PDOC
Time Seen by Provider: 05/07/18 09:22


Chief Complaint (Nursing): Lower Extremity Problem/Injury





Past Medical History


Vital Signs: 





 Last Vital Signs











Temp  97.6 F   05/07/18 09:14


 


Pulse  60   05/07/18 09:14


 


Resp  16   05/07/18 09:14


 


BP  110/81   05/07/18 09:14


 


Pulse Ox  99   05/07/18 09:14














- Medical History


PMH: Anxiety, Asthma, Atrial Fibrillation, Cardia Arrhythmia, Depression, 

Gastritis, HTN, Hypercholesterolemia


   Denies: Chronic Kidney Disease


Surgical History: Cholecystectomy, Pacemaker (6/16/16)





- [facility] Procedures











CATARAC PHACOEMULS/ASPIR (07/21/15)


INSERT LENS AT CATAR EXT (07/21/15)


INSERT OF MONITOR DEV INTO CHEST SUBCU/FASCIA, PERC APPROACH (06/14/16)


VACCINATION NEC (09/26/14)








Family History: States: Unknown Family Hx





- Social History


Hx Tobacco Use: No


Hx Alcohol Use: No


Hx Substance Use: No





- Immunization History


Hx Tetanus Toxoid Vaccination: No


Hx Influenza Vaccination: Yes


Hx Pneumococcal Vaccination: Yes





ED Course And Treatment


O2 Sat by Pulse Oximetry: 99





Disposition


Counseled Patient/Family Regarding: Studies Performed, Diagnosis, Need For 

Followup, Rx Given





- Disposition


Referrals: 


Derek Archuleta MD [Medical Doctor] - 


Disposition: HOME/ ROUTINE


Disposition Time: 10:33


Condition: STABLE


Prescriptions: 


Ibuprofen [Motrin] 1 tab PO TID PRN #30 tab


 PRN Reason: Pain


Instructions:  Osteoarthritis


Forms:  Gen Discharge Inst [place], [facility] ([place])





- Clinical Impression


Clinical Impression: 


 Joint swelling, Arthritis

## 2019-02-06 ENCOUNTER — HOSPITAL ENCOUNTER (EMERGENCY)
Dept: HOSPITAL 31 - C.ER | Age: 79
Discharge: HOME | End: 2019-02-06
Payer: MEDICARE

## 2019-02-06 VITALS
DIASTOLIC BLOOD PRESSURE: 72 MMHG | HEART RATE: 74 BPM | RESPIRATION RATE: 16 BRPM | SYSTOLIC BLOOD PRESSURE: 130 MMHG | TEMPERATURE: 98 F

## 2019-02-06 VITALS — BODY MASS INDEX: 29.2 KG/M2

## 2019-02-06 VITALS — OXYGEN SATURATION: 100 %

## 2019-02-06 DIAGNOSIS — S00.12XA: ICD-10-CM
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
## 2019-02-06 DIAGNOSIS — I10: ICD-10-CM

## 2019-02-06 DIAGNOSIS — E78.00: ICD-10-CM

## 2019-02-06 DIAGNOSIS — I48.91: ICD-10-CM

## 2019-02-06 DIAGNOSIS — S09.90XA: Primary | ICD-10-CM

## 2019-02-06 DIAGNOSIS — Z95.0: ICD-10-CM

## 2019-02-06 NOTE — C.PDOC
History Of Present Illness


77 y/o female presents to the ER complaining of headache and left periorbital 

pain. Patient states that she had a fall on 1/28/19, she felt dizzy after the 

fall. Patient reports that she was taken to [facility]. At the 

time, she had CT Scan- Head and Face and they checked her pacemaker. She was 

admitted for 4 days. She denies having any falls since her discharge from the 

hospital. Denies having worsening headache, visual changes, CP, SOB, and 

palpitations. 


Time Seen by Provider: 02/06/19 08:22


Chief Complaint (Nursing): Trauma


History Per: Patient


History/Exam Limitations: no limitations


Onset/Duration Of Symptoms: Days


Current Symptoms Are (Timing): Still Present


Severity: Moderate





Past Medical History


Reviewed: Historical Data, Nursing Documentation, Vital Signs


Vital Signs: 





                                Last Vital Signs











Temp  97.4 F L  02/06/19 08:18


 


Pulse  61   02/06/19 08:18


 


Resp  18   02/06/19 08:18


 


BP  129/82   02/06/19 08:18


 


Pulse Ox  100   02/06/19 08:18














- Medical History


PMH: Anxiety, Asthma, Atrial Fibrillation, Cardia Arrhythmia, Depression, 

Gastritis, HTN, Hypercholesterolemia


   Denies: Chronic Kidney Disease


Surgical History: Cholecystectomy, Pacemaker (6/16/16)





- [facility] Procedures











CATARAC PHACOEMULS/ASPIR (07/21/15)


INSERT LENS AT CATAR EXT (07/21/15)


INSERT OF MONITOR DEV INTO CHEST SUBCU/FASCIA, PERC APPROACH (06/14/16)


VACCINATION NEC (09/26/14)








Family History: States: No Known Family Hx





- Social History


Hx Tobacco Use: No


Hx Alcohol Use: No


Hx Substance Use: No





- Immunization History


Hx Tetanus Toxoid Vaccination: No


Hx Influenza Vaccination: Yes


Hx Pneumococcal Vaccination: Yes





Review Of Systems


Except As Marked, All Systems Reviewed And Found Negative.


Constitutional: Negative for: Fever, Chills


Eyes: Negative for: Vision Change


Cardiovascular: Negative for: Chest Pain, Palpitations


Respiratory: Negative for: Shortness of Breath


Neurological: Positive for: Headache.  Negative for: Weakness, Numbness, 

Dizziness





Physical Exam





- Physical Exam


Appears: Non-toxic, No Acute Distress, Other (comfortable)


Skin: Warm, Dry, Ecchymosis (left periorbital ecchymosis)


Head: Normacephalic, Swelling (mild left periorbital swelling)


Eye(s): bilateral: Normal Inspection, PERRL, EOMI (no pain with extraocular 

movement)


Nose: Normal


Oral Mucosa: Moist


Neck: Supple


Chest: Symmetrical


Cardiovascular: Rhythm Regular


Respiratory: Normal Breath Sounds, No Rales, No Rhonchi, No Wheezing


Gastrointestinal/Abdominal: Normal Exam, Soft, No Tenderness, No Guarding, No 

Rebound


Neurological/Psych: Oriented x3, Normal Speech





ED Course And Treatment


O2 Sat by Pulse Oximetry: 100 (RA)


Pulse Ox Interpretation: Normal


Progress Note: [facility] called and ER physician spoken with, patient 

has negative CT scans of head and mixillofacial bones when she was seen 

there/admitted. Case discussed with .  agrees with discharge plan.

Patient has been discharged home with prescriptions for Tylenol and Naproxen.





Disposition


Counseled Patient/Family Regarding: Diagnosis, Need For Followup, Rx Given





- Disposition


Referrals: 


Alton Soto Jr., MD [Medical Doctor] - 


Disposition: HOME/ ROUTINE


Disposition Time: 10:45


Condition: STABLE


Additional Instructions: 


FOLLOW UP WITH YOUR DOCTOR IN 1-2 DAYS





USE MEDICATIONS AS NEEDED FOR PAIN





RETURN TO EMERGENCY ROOM IF SYMPTOMS WORSEN 





SEGUIR CON HARVEY MDICO EN 1-2 SOTO





UTILICE MEDICAMENTOS JOSELIN SE NECESITA PARA EL DOLOR





VUELVA A LA YOLANDA DE EMERGENCIA SI LOS SNTOMAS SE WILSON AUNIDO





Prescriptions: 


Acetaminophen [Tylenol 325mg tab] 650 mg PO Q6 PRN #30 tab


 PRN Reason: pain/fever


Naproxen 375 mg PO BID PRN #20 tablet


 PRN Reason: pain


Instructions:  Closed Head Injury (DC)


Forms:  [employer] (English)


Print Language: Thai





- POA


Present On Arrival: Falls Or Trauma





- Clinical Impression


Clinical Impression: 


 Closed head injury, Periorbital ecchymosis of left eye








- Scribe Statement


The provider has reviewed the documentation as recorded by the Lisette Davis


Provider Attestation: 





All medical record entries made by the Josselynibe were at my direction and 

personally dictated by me. I have reviewed the chart and agree that the record 

accurately reflects my personal performance of the history, physical exam, 

medical decision making, and the department course for this patient. I have also

personally directed, reviewed, and agree with the discharge instructions and 

disposition.
